# Patient Record
Sex: FEMALE | Race: AMERICAN INDIAN OR ALASKA NATIVE | Employment: UNEMPLOYED | URBAN - METROPOLITAN AREA
[De-identification: names, ages, dates, MRNs, and addresses within clinical notes are randomized per-mention and may not be internally consistent; named-entity substitution may affect disease eponyms.]

---

## 2017-04-03 ENCOUNTER — OFFICE VISIT (OUTPATIENT)
Dept: NEUROLOGY | Age: 57
End: 2017-04-03

## 2017-04-03 VITALS
SYSTOLIC BLOOD PRESSURE: 122 MMHG | OXYGEN SATURATION: 98 % | WEIGHT: 209 LBS | HEART RATE: 77 BPM | BODY MASS INDEX: 35.87 KG/M2 | DIASTOLIC BLOOD PRESSURE: 82 MMHG

## 2017-04-03 DIAGNOSIS — G40.209 PARTIAL SYMPTOMATIC EPILEPSY WITH COMPLEX PARTIAL SEIZURES, NOT INTRACTABLE, WITHOUT STATUS EPILEPTICUS (HCC): ICD-10-CM

## 2017-04-03 DIAGNOSIS — G40.909 SEIZURE DISORDER (HCC): ICD-10-CM

## 2017-04-03 DIAGNOSIS — F51.04 PSYCHOPHYSIOLOGICAL INSOMNIA: ICD-10-CM

## 2017-04-03 DIAGNOSIS — F09 MILD COGNITIVE DISORDER: Primary | ICD-10-CM

## 2017-04-03 DIAGNOSIS — G25.81 RESTLESS LEGS: ICD-10-CM

## 2017-04-03 RX ORDER — LAMOTRIGINE 100 MG/1
TABLET ORAL
Qty: 60 TAB | Refills: 11 | Status: SHIPPED | OUTPATIENT
Start: 2017-04-03 | End: 2018-06-18 | Stop reason: SDUPTHER

## 2017-04-03 NOTE — MR AVS SNAPSHOT
Visit Information Date & Time Provider Department Dept. Phone Encounter #  
 4/3/2017 11:00 AM Rika Gray MD Neurology [facility] at [facility] [phone] [id number] Follow-up Instructions Return in about 1 year (around 4/3/2018). Upcoming Health Maintenance Date Due Hepatitis C Screening 1960 DTaP/Tdap/Td series (1 - Tdap) 9/24/1979 PAP AKA CERVICAL CYTOLOGY 9/24/1979 BREAST CANCER SCRN MAMMOGRAM 9/24/2008 FOBT Q 1 YEAR AGE 50-75 9/24/2008 INFLUENZA AGE 9 TO ADULT 8/1/2016 Allergies as of 4/3/2017  Review Complete On: 4/3/2017 By: Raiza Alvarez Severity Noted Reaction Type Reactions Adhesive High 11/22/2011   Topical Rash K-y Lubricating [Lubricants] High 11/22/2011   Systemic Rash Neosporin [Benzalkonium Chloride] High 11/22/2011   Systemic Itching Sulfa (Sulfonamide Antibiotics) High 11/22/2011   Systemic Itching Current Immunizations  Reviewed on 10/14/2014 No immunizations on file. Not reviewed this visit You Were Diagnosed With   
  
 Codes Comments Mild cognitive disorder    -  Primary ICD-10-CM: F09 ICD-9-CM: 294.9 Partial symptomatic epilepsy with complex partial seizures, not intractable, without status epilepticus ([facility]     ICD-10-CM: O80.358 ICD-9-CM: 345.40 Restless legs     ICD-10-CM: G25.81 ICD-9-CM: 333.94 Seizure disorder ([facility]     ICD-10-CM: G40.909 ICD-9-CM: 345.90 Vitals BP Pulse Weight(growth percentile) SpO2 BMI OB Status 122/82 77 209 lb (94.8 kg) 98% 35.87 kg/m2 Postmenopausal  
 Smoking Status Never Smoker BMI and BSA Data Body Mass Index Body Surface Area  
 35.87 kg/m 2 2.07 m 2 Preferred Pharmacy Pharmacy Name Phone [address] [phone] [address] [phone] Your Updated Medication List  
  
   
 This list is accurate as of: 4/3/17 11:51 AM.  Always use your most recent med list.  
  
  
  
  
 ascorbic acid (vitamin C) 500 mg tablet Commonly known as:  VITAMIN C Take  by mouth.  
  
 calcium polycarbophil 625 mg tablet Commonly known as:  Remona Tayler Take 625 mg by mouth daily. clonazePAM 0.5 mg tablet Commonly known as:  Smallwood Nickerson Take 2 tabs at night and 1 tab BID prn anxiety  
  
 cyanocobalamin 500 mcg tablet Commonly known as:  VITAMIN B12 Take 500 mcg by mouth daily. doxepin 100 mg capsule Commonly known as:  SINEquan Take  by mouth nightly. ferrous sulfate 325 mg (65 mg iron) tablet Take  by mouth Daily (before breakfast). gabapentin 100 mg capsule Commonly known as:  NEURONTIN  
1 in afternoon and 2 at 5 PM  
  
 HYDROcodone-acetaminophen 5-325 mg per tablet Commonly known as:  Fredick Sly Take 1 Tab by mouth every four (4) hours as needed for Pain. Max Daily Amount: 6 Tabs. lamoTRIgine 100 mg tablet Commonly known as: LaMICtal  
TAKE 1 TABLET BY MOUTH 2 TIMES A DAY  
  
 multivitamin tablet Commonly known as:  ONE A DAY Take 1 Tab by mouth daily. omeprazole 20 mg tablet Commonly known as:  PriLOSEC OTC Take 20 mg by mouth daily. pramipexole 0.25 mg tablet Commonly known as:  MIRAPEX TAKE 1 TABLET BY MOUTH AT BEDTIME  
  
 promethazine 25 mg tablet Commonly known as:  PHENERGAN Take 1 Tab by mouth every six (6) hours as needed. ZOLOFT 100 mg tablet Generic drug:  sertraline Take 200 mg by mouth daily. zolpidem 5 mg tablet Commonly known as:  AMBIEN Take  by mouth nightly as needed for Sleep. Prescriptions Sent to Pharmacy Refills  
 lamoTRIgine (LAMICTAL) 100 mg tablet 11 Sig: TAKE 1 TABLET BY MOUTH 2 TIMES A DAY Class: Normal  
 Pharmacy: [address] [place] [address] #: [phone] Follow-up Instructions Return in about 1 year (around 4/3/2018). To-Do List   
 04/04/2017 Neurology:  EEG Patient Instructions A Healthy Lifestyle: Care Instructions Your Care Instructions A healthy lifestyle can help you feel good, stay at a healthy weight, and have plenty of energy for both work and play. A healthy lifestyle is something you can share with your whole family. A healthy lifestyle also can lower your risk for serious health problems, such as high blood pressure, heart disease, and diabetes. You can follow a few steps listed below to improve your health and the health of your family. Follow-up care is a key part of your treatment and safety. Be sure to make and go to all appointments, and call your doctor if you are having problems. Its also a good idea to know your test results and keep a list of the medicines you take. How can you care for yourself at home? · Do not eat too much sugar, fat, or fast foods. You can still have dessert and treats now and then. The goal is moderation. · Start small to improve your eating habits. Pay attention to portion sizes, drink less juice and soda pop, and eat more fruits and vegetables. ¨ Eat a healthy amount of food. A 3-ounce serving of meat, for example, is about the size of a deck of cards. Fill the rest of your plate with vegetables and whole grains. ¨ Limit the amount of soda and sports drinks you have every day. Drink more water when you are thirsty. ¨ Eat at least 5 servings of fruits and vegetables every day. It may seem like a lot, but it is not hard to reach this goal. A serving or helping is 1 piece of fruit, 1 cup of vegetables, or 2 cups of leafy, raw vegetables. Have an apple or some carrot sticks as an afternoon snack instead of a candy bar. Try to have fruits and/or vegetables at every meal. 
· Make exercise part of your daily routine.  You may want to start with simple activities, such as walking, bicycling, or slow swimming. Try to be active 30 to 60 minutes every day. You do not need to do all 30 to 60 minutes all at once. For example, you can exercise 3 times a day for 10 or 20 minutes. Moderate exercise is safe for most people, but it is always a good idea to talk to your doctor before starting an exercise program. 
· Keep moving. Brenda Edwardo the lawn, work in the garden, or Qonf. Take the stairs instead of the elevator at work. · If you smoke, quit. People who smoke have an increased risk for heart attack, stroke, cancer, and other lung illnesses. Quitting is hard, but there are ways to boost your chance of quitting tobacco for good. ¨ Use nicotine gum, patches, or lozenges. ¨ Ask your doctor about stop-smoking programs and medicines. ¨ Keep trying. In addition to reducing your risk of diseases in the future, you will notice some benefits soon after you stop using tobacco. If you have shortness of breath or asthma symptoms, they will likely get better within a few weeks after you quit. · Limit how much alcohol you drink. Moderate amounts of alcohol (up to 2 drinks a day for men, 1 drink a day for women) are okay. But drinking too much can lead to liver problems, high blood pressure, and other health problems. Family health If you have a family, there are many things you can do together to improve your health. · Eat meals together as a family as often as possible. · Eat healthy foods. This includes fruits, vegetables, lean meats and dairy, and whole grains. · Include your family in your fitness plan. Most people think of activities such as jogging or tennis as the way to fitness, but there are many ways you and your family can be more active. Anything that makes you breathe hard and gets your heart pumping is exercise. Here are some tips: 
¨ Walk to do errands or to take your child to school or the bus. ¨ Go for a family bike ride after dinner instead of watching TV. Where can you learn more? Go to http://tiffanie-andreina.info/. Enter J916 in the search box to learn more about \"A Healthy Lifestyle: Care Instructions. \" Current as of: July 26, 2016 Content Version: 11.2 © 3904-4906 [employer]. Care instructions adapted under license by [employer] (which disclaims liability or warranty for this information). If you have questions about a medical condition or this instruction, always ask your healthcare professional. [address] any warranty or liability for your use of this information. Introducing [facility]! Dear Bernadette Kline: Thank you for requesting a [employer] account. Our records indicate that you already have an active [employer] account. You can access your account anytime at [email or web address] Did you know that you can access your hospital and ER discharge instructions at any time in [employer]? You can also review all of your test results from your hospital stay or ER visit. Additional Information If you have questions, please visit the Frequently Asked Questions section of the [employer] website at [email or web address] Remember, [employer] is NOT to be used for urgent needs. For medical emergencies, dial 911. Now available from your iPhone and Android! Please provide this summary of care documentation to your next provider. Your primary care clinician is listed as Layton Fisher. If you have any questions after today's visit, please call [phone].

## 2017-04-03 NOTE — PATIENT INSTRUCTIONS

## 2017-04-03 NOTE — PROGRESS NOTES
Chief Complaint: Seizures    No seizures since her last visit had a few episodes of double vision. She is content with Lamictal. She has some memory issues. She has no new complaints Discussed insomnia and proper sleep hygeine      Assesment and Plan  1. Partial symptomatic epilepsy with complex partial seizures, not intractable, without status epilepticus (HCC)  - lamoTRIgine (LAMICTAL) 100 mg tablet; TAKE 1 TABLET BY MOUTH 2 TIMES A DAY  Dispense: 60 Tab; Refill: 11    - EEG; Future    2. Mild cognitive disorder  May be medications induced    3. Restless legs  Continue mirapex    4. Insomnia  Encouraged proper sleep hygiene  Continue doxepin    Allergies  Adhesive; K-y lubricating [lubricants]; Neosporin [benzalkonium chloride]; and Sulfa (sulfonamide antibiotics)     Medications  Current Outpatient Prescriptions   Medication Sig    omeprazole (PRILOSEC OTC) 20 mg tablet Take 20 mg by mouth daily.  doxepin (SINEQUAN) 100 mg capsule Take  by mouth nightly.  zolpidem (AMBIEN) 5 mg tablet Take  by mouth nightly as needed for Sleep.  ferrous sulfate 325 mg (65 mg iron) tablet Take  by mouth Daily (before breakfast).  calcium polycarbophil (FIBERCON) 625 mg tablet Take 625 mg by mouth daily.  cyanocobalamin (VITAMIN B12) 500 mcg tablet Take 500 mcg by mouth daily.  multivitamin (ONE A DAY) tablet Take 1 Tab by mouth daily.  ascorbic acid (VITAMIN C) 500 mg tablet Take  by mouth.  gabapentin (NEURONTIN) 100 mg capsule 1 in afternoon and 2 at 5 PM (Patient taking differently: 300 mg. 1 in afternoon and 2 at 5 PM)    lamoTRIgine (LAMICTAL) 100 mg tablet TAKE 1 TABLET BY MOUTH 2 TIMES A DAY    clonazePAM (KLONOPIN) 0.5 mg tablet Take 2 tabs at night and 1 tab BID prn anxiety    sertraline (ZOLOFT) 100 mg tablet Take 200 mg by mouth daily.  HYDROcodone-acetaminophen (NORCO) 5-325 mg per tablet Take 1 Tab by mouth every four (4) hours as needed for Pain. Max Daily Amount: 6 Tabs.     promethazine (PHENERGAN) 25 mg tablet Take 1 Tab by mouth every six (6) hours as needed.  pramipexole (MIRAPEX) 0.25 mg tablet TAKE 1 TABLET BY MOUTH AT BEDTIME     No current facility-administered medications for this visit. Medical History  Past Medical History:   Diagnosis Date    Localization-related (focal) (partial) epilepsy and epileptic syndromes with complex partial seizures, without mention of intractable epilepsy 10/3/2013    Mild cognitive disorder 10/3/2013    Psychiatric disorder     depression    Restless leg syndrome      Review of Systems   Constitutional: Negative for chills and fever. HENT: Negative for ear pain. Eyes: Negative for pain and discharge. Respiratory: Negative for cough and hemoptysis. Cardiovascular: Negative for chest pain and claudication. Gastrointestinal: Negative for constipation and diarrhea. Genitourinary: Negative for flank pain and hematuria. Musculoskeletal: Negative for back pain and myalgias. Skin: Negative for itching and rash. Neurological: Negative for speech change, focal weakness and headaches. Endo/Heme/Allergies: Negative for environmental allergies. Does not bruise/bleed easily. Psychiatric/Behavioral: Positive for memory loss. Negative for depression and hallucinations. The patient is nervous/anxious and has insomnia. Exam:    Visit Vitals    /82    Pulse 77    Wt 209 lb (94.8 kg)    SpO2 98%    BMI 35.87 kg/m2        Physical Exam   Constitutional: She is oriented to person, place, and time and well-developed, well-nourished, and in no distress. HENT:   Head: Normocephalic and atraumatic. Eyes: Conjunctivae and EOM are normal. Pupils are equal, round, and reactive to light. Neck: Neck supple. No JVD present. Carotid bruit is not present. No thyromegaly present. Cardiovascular: Normal rate, regular rhythm and normal heart sounds.     Pulmonary/Chest: Effort normal and breath sounds normal. No respiratory distress. She has no wheezes. She has no rales. Abdominal: Soft. Bowel sounds are normal. She exhibits no distension. There is no tenderness. Neurological: She is alert and oriented to person, place, and time. She has normal strength, normal reflexes and intact cranial nerves. Gait normal.   Eyes: PERRLA, Conjugate eye movements with no Nystagmus or ptosis  Sensory :Preceives crude touch and vibration in a symmetric fashion in proximal and distal limbs. Gait is well balanced with good arm swing. Skin: No rash noted. No erythema. Imaging    CT Results (most recent):    Results from Hospital Encounter encounter on 05/18/16   CTA CHEST W WO CONT   Narrative **Final Report**      ICD Codes / Adm. Diagnosis: 479606   / Chest Pain  Chest Pain; SOB  Examination:  CT CHEST ANGIOGRAPHY  - [id number] - May 18 2016  6:33PM  Accession No:  [id number]  Reason:  CP      REPORT:  EXAM:  CT CHEST ANGIOGRAPHY  INDICATION:   CP  Additional history: Chest pain radiating to the back and to the jaw x2-3   hours. COMPARISON: None. Limitations: Motion limited exam  .  TECHNIQUE:   Precontrast  images were obtained to localize the volume for   acquisition. Multislice helical CT arteriography was performed from the   diaphragm to the thoracic inlet during uneventful rapid bolus intravenous   contrast administration. Lung and soft tissue windows were generated. Coronal and sagittal images were generated and 3D post processing consisting   of coronal maximum intensity images was performed. Nelly Aaron FINDINGS:  CHEST:  Chest wall/thoracic inlet: Within normal limits. Thyroid: Within normal limits. Mediastinum/rita: Within normal limits. Heart/vessels: Within normal limits. Lungs/Pleura: Normal areas of groundglass attenuation in the perihilar, left   upper lobe. As a 0.6 cm nodule in the left, posterior costophrenic sulcus  . INCIDENTALLY IMAGED ABDOMEN:  Post surgical changes suggesting gastric bypass.  There is a heterogeneous   lesion in the posterior liver, segment 6 which may represent a hemangioma   . MSK:   Degenerative changes throughout the spine. .      IMPRESSION: Motion limited. 1. No visualized pulmonary embolus. 2. Small, discontinuous patchy areas of groundglass attenuation in the   perihilar, left upper lobe which is a nonspecific finding and could   represent an infectious/inflammatory process. Recommend follow-up until   resolution. 3. Heterogeneous lesion in segment 6 of the liver which may represent a   hemangioma. Recommend comparison to previous abdominal imaging to ensure   stability. 4. There is a 0.6 cm nodule in the posterior, left costophrenic sulcus. 5. Incidental findings as above. Signing/Reading Doctor: Tasha Haines ([id number])    Sky Huerta ([id number])  May 18 2016  6:47PM                                   MRI Results (most recent):  No results found for this or any previous visit.     .  Lab Review    Lab Results   Component Value Date/Time    WBC 6.2 05/18/2016 02:43 PM    HCT 39.9 05/18/2016 02:43 PM    HGB 12.6 05/18/2016 02:43 PM    PLATELET 641 27/49/5482 02:43 PM       Lab Results   Component Value Date/Time    Sodium 143 05/18/2016 02:43 PM    Potassium 4.6 05/18/2016 02:43 PM    Chloride 110 05/18/2016 02:43 PM    CO2 27 05/18/2016 02:43 PM    Glucose 79 05/18/2016 02:43 PM    BUN 9 05/18/2016 02:43 PM    Creatinine 0.73 05/18/2016 02:43 PM    Calcium 8.9 05/18/2016 02:43 PM         Lab Results   Component Value Date/Time    Vitamin B12 1034 05/28/2014 08:35 AM    Folate >19.9 05/28/2014 08:35 AM

## 2017-10-23 ENCOUNTER — TELEPHONE (OUTPATIENT)
Dept: NEUROLOGY | Age: 57
End: 2017-10-23

## 2017-10-23 NOTE — TELEPHONE ENCOUNTER
I spoke with the patient. She wanted to let Dr Kwesi Garcia know that about a month ago, she was in bed and started feeling shaky in her eyes, then got tired. Didn't realize she had fallen asleep and then woke up with blood on her pillow from biting it. Thinks she had another seizure, but has no witnesses of it. She never got her EEG and deciding to do this now.  Gave her the phone number to the patient care team to schedule  Please advise if anything else needs to be done in the meantime

## 2017-10-31 NOTE — TELEPHONE ENCOUNTER
Spoke with the patient and asked when she was going to get her EEG done, patient stated that she hasn't scheduled it yet.  Advised to the patient to call once she gets that done so Dr. Park Valentin would be able to follow up with her

## 2017-11-08 ENCOUNTER — TELEPHONE (OUTPATIENT)
Dept: NEUROLOGY | Age: 57
End: 2017-11-08

## 2017-11-08 NOTE — TELEPHONE ENCOUNTER
Patient has her EEG sched for 11/14 at 8 am and requested to spk with the nurse for an apptmt for the results

## 2017-11-09 NOTE — TELEPHONE ENCOUNTER
Advised to the patient to give me a call once she has the testing done and I would be able to send Dr. Brody Ace a message to review the results and give her a call back

## 2017-11-14 ENCOUNTER — HOSPITAL ENCOUNTER (OUTPATIENT)
Dept: NEUROLOGY | Age: 57
Discharge: HOME | End: 2017-11-14
Attending: PSYCHIATRY & NEUROLOGY
Payer: MEDICARE

## 2017-11-14 DIAGNOSIS — G40.209 PARTIAL SYMPTOMATIC EPILEPSY WITH COMPLEX PARTIAL SEIZURES, NOT INTRACTABLE, WITHOUT STATUS EPILEPTICUS (HCC): ICD-10-CM

## 2017-11-14 PROCEDURE — 95816 EEG AWAKE AND DROWSY: CPT

## 2017-11-18 NOTE — PROCEDURES
[address]   EEG       Name:  Kristi Weber   MR#:  [id number]   :  1960   Account #:  [de-identified]    Date of Procedure:  2017   Date of Adm:  2017       Electrodes were applied in accordance with International 10/20 system   electrode placement. The EEG was reviewed in both bipolar and   referential accordance. Background consists of symmetric 10 Hz activity which attenuated with   eye opening. Photic stimulation produced a symmetric occipital drive. No seizures or interictal hallmarks of epilepsy are noted on the study. IMPRESSION: Normal electroencephalogram. The absence of   seizures on this study does not exclude that diagnosis of epilepsy. Clinical correlation is advised.         Valerie Avalos MD       / [facility], [facility].   D:  2017   15:17   T:  2017   16:54   Job #:  [id number]

## 2017-11-20 ENCOUNTER — TELEPHONE (OUTPATIENT)
Dept: NEUROLOGY | Age: 57
End: 2017-11-20

## 2017-11-20 NOTE — TELEPHONE ENCOUNTER
----- Message from Amisha William sent at 11/20/2017 12:27 PM EST -----  Regarding: Dr Sena/Telephone  Pt would like to receive her EEG results. Best contact number is [phone].

## 2017-11-21 NOTE — TELEPHONE ENCOUNTER
Normal electroencephalogram. The absence of   seizures on this study does not exclude that diagnosis of epilepsy. Clinical correlation is advised.

## 2018-06-18 DIAGNOSIS — G40.909 SEIZURE DISORDER (HCC): ICD-10-CM

## 2018-06-18 NOTE — TELEPHONE ENCOUNTER
No future appointments. Last Appointment My Department:  Visit date not found    Would you like to refill below?     Requested Prescriptions     Pending Prescriptions Disp Refills    lamoTRIgine (LAMICTAL) 100 mg tablet 60 Tab 11     Sig: TAKE 1 TABLET BY MOUTH 2 TIMES A DAY

## 2018-06-19 RX ORDER — LAMOTRIGINE 100 MG/1
TABLET ORAL
Qty: 60 TAB | Refills: 11 | Status: SHIPPED | OUTPATIENT
Start: 2018-06-19 | End: 2019-09-03 | Stop reason: SDUPTHER

## 2018-06-20 ENCOUNTER — TELEPHONE (OUTPATIENT)
Dept: NEUROLOGY | Age: 58
End: 2018-06-20

## 2018-11-25 ENCOUNTER — HOSPITAL ENCOUNTER (EMERGENCY)
Age: 58
Discharge: HOME | End: 2018-11-25
Attending: EMERGENCY MEDICINE
Payer: MEDICARE

## 2018-11-25 ENCOUNTER — APPOINTMENT (OUTPATIENT)
Dept: CT IMAGING | Age: 58
End: 2018-11-25
Attending: EMERGENCY MEDICINE
Payer: MEDICARE

## 2018-11-25 ENCOUNTER — APPOINTMENT (OUTPATIENT)
Dept: GENERAL RADIOLOGY | Age: 58
End: 2018-11-25
Attending: NURSE PRACTITIONER
Payer: MEDICARE

## 2018-11-25 VITALS
HEIGHT: 64 IN | WEIGHT: 210.76 LBS | SYSTOLIC BLOOD PRESSURE: 175 MMHG | OXYGEN SATURATION: 99 % | HEART RATE: 89 BPM | BODY MASS INDEX: 35.98 KG/M2 | DIASTOLIC BLOOD PRESSURE: 101 MMHG | TEMPERATURE: 97.8 F | RESPIRATION RATE: 16 BRPM

## 2018-11-25 DIAGNOSIS — S02.2XXA CLOSED FRACTURE OF NASAL BONE, INITIAL ENCOUNTER: ICD-10-CM
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
## 2018-11-25 PROCEDURE — 90471 IMMUNIZATION ADMIN: CPT

## 2018-11-25 PROCEDURE — 70450 CT HEAD/BRAIN W/O DYE: CPT

## 2018-11-25 PROCEDURE — 99282 EMERGENCY DEPT VISIT SF MDM: CPT

## 2018-11-25 PROCEDURE — 74011250636 HC RX REV CODE- 250/636: Performed by: NURSE PRACTITIONER

## 2018-11-25 PROCEDURE — 73110 X-RAY EXAM OF WRIST: CPT

## 2018-11-25 PROCEDURE — 90715 TDAP VACCINE 7 YRS/> IM: CPT | Performed by: NURSE PRACTITIONER

## 2018-11-25 PROCEDURE — 70486 CT MAXILLOFACIAL W/O DYE: CPT

## 2018-11-25 PROCEDURE — 73130 X-RAY EXAM OF HAND: CPT

## 2018-11-25 RX ORDER — AMOXICILLIN AND CLAVULANATE POTASSIUM 875; 125 MG/1; MG/1
1 TABLET, FILM COATED ORAL 2 TIMES DAILY
Qty: 20 TAB | Refills: 0 | Status: SHIPPED | OUTPATIENT
Start: 2018-11-25 | End: 2018-12-05

## 2018-11-25 RX ORDER — HYDROCODONE BITARTRATE AND ACETAMINOPHEN 5; 325 MG/1; MG/1
1 TABLET ORAL
Qty: 20 TAB | Refills: 0 | Status: SHIPPED | OUTPATIENT
Start: 2018-11-25 | End: 2022-06-10

## 2018-11-25 RX ADMIN — TETANUS TOXOID, REDUCED DIPHTHERIA TOXOID AND ACELLULAR PERTUSSIS VACCINE, ADSORBED 0.5 ML: 5; 2.5; 8; 8; 2.5 SUSPENSION INTRAMUSCULAR at 17:58

## 2018-11-25 NOTE — DISCHARGE INSTRUCTIONS
We hope that we have addressed all of your medical concerns. The examination and treatment you received in the Emergency Department were for an emergent problem and were not intended as complete care. It is important that you follow up with your healthcare provider(s) for ongoing care. If your symptoms worsen or do not improve as expected, and you are unable to reach your usual health care provider(s), you should return to the Emergency Department. [facility] participate in nationally recognized quality of care measures. If your blood pressure is greater than 120/80, as reported below, we urge that you seek medical care to address the potential of high blood pressure, commonly known as hypertension. Hypertension can be hereditary or can be caused by certain medical conditions, pain, stress, or \"white coat syndrome. \"       Please make an appointment with your health care provider(s) for follow up of your Emergency Department visit. VITALS:   Patient Vitals for the past 8 hrs:   Temp Pulse Resp BP SpO2   11/25/18 1459 97.8 °F (36.6 °C) 89 16 (!) 175/101 99 %          Thank you for allowing us to provide you with medical care today. We realize that you have many choices for your emergency care needs. Please choose us in the future for any continued health care needs. Delma Trevino NP              No results found for this or any previous visit (from the past 24 hour(s)). Xr Wrist Rt Ap/lat/obl Min 3v    Result Date: 11/25/2018  XR WRIST RT AP/LAT/OBL MIN 3V, XR HAND RT MIN 3 V ,11/25/2018 5:35 PM INDICATION: Additional history: Ground-level fall last night. Pain and swelling to the fingers on right hand. COMPARISON: None . FINDINGS: WRIST: 3 views The bones, joints, and soft tissues of the wrist are unremarkable. The joint spaces and alignment are maintained. Fidel Cancer HAND: 3 views No visible fracture or malalignment.  There are degenerative changes in the interphalangeal joints. .    IMPRESSION:  1. No visible fracture or malalignment. Xr Hand Rt Min 3 V    Result Date: 11/25/2018  XR WRIST RT AP/LAT/OBL MIN 3V, XR HAND RT MIN 3 V ,11/25/2018 5:35 PM INDICATION: Additional history: Ground-level fall last night. Pain and swelling to the fingers on right hand. COMPARISON: None . FINDINGS: WRIST: 3 views The bones, joints, and soft tissues of the wrist are unremarkable. The joint spaces and alignment are maintained. Bernice Hollow HAND: 3 views No visible fracture or malalignment. There are degenerative changes in the interphalangeal joints. .    IMPRESSION:  1. No visible fracture or malalignment. Ct Head Wo Cont    Result Date: 11/25/2018  EXAM:  CT HEAD WO CONT INDICATION:   head injury with possible LOC. Additional history:Ground-level fall last night after tripping questionable loss of consciousness. Facial pain. COMPARISON: CT of the head, 11/22/2011. . TECHNIQUE: Unenhanced CT of the head was performed using 5 mm images. Coronal and sagittal reformats were produced. Brain and bone windows were generated. CT dose reduction was achieved through use of a standardized protocol tailored for this examination and automatic exposure control for dose modulation. Bernice Hollow FINDINGS: The ventricles and sulci are normal in size, shape and configuration and midline. There is no significant white matter disease. There is no intracranial hemorrhage, extra-axial collection, mass, mass effect or midline shift. The basilar cisterns are open. No acute infarct is identified. The bone windows demonstrate no abnormalities. The visualized portions of the paranasal sinuses and mastoid air cells are clear. Bernice Hollow IMPRESSION: 1. No evidence of acute intracranial abnormality by this modality.       Ct Maxillofacial Wo Cont    Result Date: 11/25/2018  EXAM:  CT MAXILLOFACIAL WO CONT INDICATION:   Facial pain s/o glf Additional history:Ground-level fall last night after tripping questionable loss of consciousness. Facial pain. COMPARISON:  None. TECHNIQUE:  Multislice helical CT of the facial bones was performed in the axial plane without intravenous contrast administration. Coronal and sagittal reformations were generated. CT dose reduction was achieved through use of a standardized protocol tailored for this examination and automatic exposure control for dose modulation. FINDINGS: Bony fragment displaced anteriorly off at the bridge of the nose. The visualized paranasal sinuses and mastoid air cells are clear. The globes, optic nerves and extraocular muscles are normal. No abnormalities are identified within the visualized portions of the nasopharynx. IMPRESSION: 1. Likely nasal bone fracture.

## 2018-11-25 NOTE — ED NOTES
I have assumed care of the patient. The patient has been placed in a position of comfort with the call bell within reach. The patient presents to the ED with complaints of pain and swelling with bruising to the right hand and the nose after falling yesterday while putting up [place] directions.

## 2018-11-26 NOTE — ED PROVIDER NOTES
EMERGENCY DEPARTMENT HISTORY AND PHYSICAL EXAM 
 
 
Date: 11/25/2018 Patient Name: Ezequiel Soemrs History of Presenting Illness Chief Complaint Patient presents with  Fall Ambulatory into triage with c/o facial pain s/p glf last night. Reports tripping, causing her to fall forward onto her face. She is unsure of LOC. Sent to ED from [facility] for futher eval. Pt also reports pain and swelling to fingers on her Rt hand-reports negative x-ray at [facility], but she did no bring a copy of the images. A&O and interacting appropirately in triage.  Facial Pain  Hand Pain History Provided By: Patient HPI: Ezequiel Somers, 59 y.o. female with PMHx significant for RLS, presents ambulatory from home to the ED with cc of facial pain and right hand pain. She fell last night while trying to assemble [employer]. Uncertain if there was LOC. Referred from [facility]. No altered sensorium. Right hand is bruised and swollen. She has superficial abrasions to face. Last tetanus unknown. Pt denies fevers, chills, night sweats, chest pain, pressure, SOB, MEZA, PND, orthopnea, abdominal pain, n/v/d, melena, hematuria, dysuria, constipation, HA, dizziness, and syncope. PCP: Santino Bruno MD 
 
Current Outpatient Medications Medication Sig Dispense Refill  amoxicillin-clavulanate (AUGMENTIN) 875-125 mg per tablet Take 1 Tab by mouth two (2) times a day for 10 days. 20 Tab 0  
 HYDROcodone-acetaminophen (NORCO) 5-325 mg per tablet Take 1 Tab by mouth every four (4) hours as needed for Pain. Max Daily Amount: 6 Tabs. 20 Tab 0  
 lamoTRIgine (LAMICTAL) 100 mg tablet TAKE 1 TABLET BY MOUTH 2 TIMES A DAY 60 Tab 11  
 promethazine (PHENERGAN) 25 mg tablet Take 1 Tab by mouth every six (6) hours as needed. 12 Tab 0  
 omeprazole (PRILOSEC OTC) 20 mg tablet Take 20 mg by mouth daily. 30 Tab 0  
 doxepin (SINEQUAN) 100 mg capsule Take  by mouth nightly.  zolpidem (AMBIEN) 5 mg tablet Take  by mouth nightly as needed for Sleep.  ferrous sulfate 325 mg (65 mg iron) tablet Take  by mouth Daily (before breakfast).  calcium polycarbophil (FIBERCON) 625 mg tablet Take 625 mg by mouth daily.  cyanocobalamin (VITAMIN B12) 500 mcg tablet Take 500 mcg by mouth daily.  multivitamin (ONE A DAY) tablet Take 1 Tab by mouth daily.  ascorbic acid (VITAMIN C) 500 mg tablet Take  by mouth.  gabapentin (NEURONTIN) 100 mg capsule 1 in afternoon and 2 at 5 PM (Patient taking differently: 300 mg. 1 in afternoon and 2 at 5 PM) 90 Cap 1  
 pramipexole (MIRAPEX) 0.25 mg tablet TAKE 1 TABLET BY MOUTH AT BEDTIME 30 tablet 3  clonazePAM (KLONOPIN) 0.5 mg tablet Take 2 tabs at night and 1 tab BID prn anxiety 90 tablet 4  
 sertraline (ZOLOFT) 100 mg tablet Take 200 mg by mouth daily. Past History Past Medical History: 
Past Medical History:  
Diagnosis Date  Localization-related (focal) (partial) epilepsy and epileptic syndromes with complex partial seizures, without mention of intractable epilepsy 10/3/2013  Mild cognitive disorder 10/3/2013  Psychiatric disorder   
 depression  Restless leg syndrome Past Surgical History: 
Past Surgical History:  
Procedure Laterality Date  BREAST SURGERY PROCEDURE UNLISTED    
 fibroid removal  
 HX DILATION AND CURETTAGE    
 HX GASTRIC BYPASS  HX ORTHOPAEDIC    
 anthony. heel spurs  HX TONSILLECTOMY Family History: No family history on file. Social History: 
Social History Tobacco Use  Smoking status: Never Smoker Substance Use Topics  Alcohol use: Yes Comment: \"sometimes\"  Drug use: Not on file Allergies: Allergies Allergen Reactions  Adhesive Rash  K-Y Lubricating [Lubricants] Rash  Neosporin [Benzalkonium Chloride] Itching  Sulfa (Sulfonamide Antibiotics) Itching Review of Systems Review of Systems Constitutional: Negative for activity change, appetite change, chills, diaphoresis, fatigue, fever and unexpected weight change. HENT: Negative for congestion, ear pain, rhinorrhea, sinus pressure, sore throat and tinnitus. Facial pain Eyes: Negative for photophobia, pain, discharge and visual disturbance. Respiratory: Negative for apnea, cough, choking, chest tightness, shortness of breath, wheezing and stridor. Cardiovascular: Negative for chest pain, palpitations and leg swelling. Gastrointestinal: Negative for abdominal pain, constipation, diarrhea, nausea and vomiting. Endocrine: Negative for polydipsia, polyphagia and polyuria. Genitourinary: Negative for decreased urine volume, dyspareunia, dysuria, enuresis, flank pain, frequency, hematuria and urgency. Musculoskeletal: Positive for arthralgias. Negative for back pain, gait problem, myalgias and neck pain. Skin: Positive for wound. Negative for color change, pallor and rash. Allergic/Immunologic: Negative for immunocompromised state. Neurological: Negative for dizziness, seizures, syncope, weakness, light-headedness and headaches. Hematological: Does not bruise/bleed easily. Psychiatric/Behavioral: Negative for agitation and confusion. The patient is not nervous/anxious. Physical Exam  
Physical Exam  
Constitutional: She is oriented to person, place, and time. She appears well-developed and well-nourished. No distress. HENT:  
Head: Normocephalic. Right Ear: External ear normal.  
Left Ear: External ear normal.  
Nose: Nose normal. No nasal septal hematoma. Mouth/Throat: Oropharynx is clear and moist. No oropharyngeal exudate, posterior oropharyngeal edema, posterior oropharyngeal erythema or tonsillar abscesses. Eyes: Conjunctivae and EOM are normal. Pupils are equal, round, and reactive to light. Right eye exhibits no discharge. Left eye exhibits no discharge. No scleral icterus. Neck: Normal range of motion. Neck supple. No JVD present. No spinous process tenderness and no muscular tenderness present. No neck rigidity. No tracheal deviation, no edema, no erythema and normal range of motion present. No thyromegaly present. Cardiovascular: Normal rate, regular rhythm, normal heart sounds and intact distal pulses. Exam reveals no gallop and no friction rub. No murmur heard. Pulmonary/Chest: Effort normal and breath sounds normal. No stridor. No respiratory distress. She has no wheezes. She has no rales. She exhibits no tenderness. Abdominal: Soft. Bowel sounds are normal. She exhibits no distension and no mass. There is no tenderness. There is no rebound and no guarding. Musculoskeletal: Normal range of motion. She exhibits no edema. Right hand: She exhibits tenderness, bony tenderness and swelling. She exhibits normal range of motion, normal two-point discrimination, normal capillary refill, no deformity and no laceration. Normal sensation noted. Decreased sensation is not present in the ulnar distribution, is not present in the medial distribution and is not present in the radial distribution. Normal strength noted. She exhibits no finger abduction and no wrist extension trouble. Hands: 
Lymphadenopathy:  
  She has no cervical adenopathy. Neurological: She is alert and oriented to person, place, and time. She has normal strength. She displays normal reflexes. No cranial nerve deficit or sensory deficit. Coordination normal. GCS eye subscore is 4. GCS verbal subscore is 5. GCS motor subscore is 6. Skin: Skin is warm and dry. No rash noted. She is not diaphoretic. No erythema. No pallor. Psychiatric: She has a normal mood and affect. Her behavior is normal. Judgment and thought content normal.  
Nursing note and vitals reviewed. Diagnostic Study Results Labs - No results found for this or any previous visit (from the past 12 hour(s)). Radiologic Studies -  
XR HAND RT MIN 3 V Final Result XR WRIST RT AP/LAT/OBL MIN 3V Final Result CT MAXILLOFACIAL WO CONT Final Result CT HEAD WO CONT Final Result CT Results  (Last 48 hours)  
          
 11/25/18 1551  CT HEAD WO CONT Final result Impression:  IMPRESSION:   
1. No evidence of acute intracranial abnormality by this modality. Narrative:  EXAM:  CT HEAD WO CONT INDICATION:   head injury with possible LOC. Additional history:Ground-level fall last night after tripping questionable loss  
of consciousness. Facial pain. COMPARISON: CT of the head, 11/22/2011. .  
TECHNIQUE:   
Unenhanced CT of the head was performed using 5 mm images. Coronal and sagittal  
reformats were produced. Brain and bone windows were generated. CT dose reduction was achieved through use of a standardized protocol tailored  
for this examination and automatic exposure control for dose modulation. [facility] FINDINGS:  
The ventricles and sulci are normal in size, shape and configuration and  
midline. There is no significant white matter disease. There is no intracranial  
hemorrhage, extra-axial collection, mass, mass effect or midline shift. The  
basilar cisterns are open. No acute infarct is identified. The bone windows  
demonstrate no abnormalities. The visualized portions of the paranasal sinuses  
and mastoid air cells are clear. .  
  
 11/25/18 1551  CT MAXILLOFACIAL WO CONT Final result Impression:  IMPRESSION:   
1. Likely nasal bone fracture. Narrative:  EXAM:  CT MAXILLOFACIAL WO CONT INDICATION:   Facial pain s/o glf Additional history:Ground-level fall last night after tripping questionable loss  
of consciousness. Facial pain. COMPARISON:  None. TECHNIQUE:  Multislice helical CT of the facial bones was performed in the axial  
plane without intravenous contrast administration.  Coronal and sagittal  
 reformations were generated. CT dose reduction was achieved through use of a  
standardized protocol tailored for this examination and automatic exposure  
control for dose modulation. FINDINGS:  
   
Bony fragment displaced anteriorly off at the bridge of the nose. The visualized paranasal sinuses and mastoid air cells are clear. The globes, optic nerves and extraocular muscles are normal.  
   
No abnormalities are identified within the visualized portions of the  
nasopharynx. CXR Results  (Last 48 hours) None Medical Decision Making I am the first provider for this patient. I reviewed the vital signs, available nursing notes, past medical history, past surgical history, family history and social history. Vital Signs-Reviewed the patient's vital signs. Patient Vitals for the past 12 hrs: 
 Temp Pulse Resp BP SpO2  
11/25/18 1459 97.8 °F (36.6 °C) 89 16 (!) 175/101 99 % Pulse Oximetry Analysis - 99% on RA Cardiac Monitor:  
Rate: 89 bpm 
 
Records Reviewed: Nursing Notes and Old Medical Records Provider Notes (Medical Decision Making): Mechanical fall - facial trauma and right hand pain CT max face XR right hand Update tetanus Splint right hand ED Course:  
Initial assessment performed. The patients presenting problems have been discussed, and they are in agreement with the care plan formulated and outlined with them. I have encouraged them to ask questions as they arise throughout their visit. Stable, ambulatory pt in [facility] Critical Care Time:  
0 Disposition: 
Discharge to home with PCP and Ortho follow up PLAN: 
1. Discharge Medication List as of 11/25/2018  6:13 PM  
  
START taking these medications Details  
amoxicillin-clavulanate (AUGMENTIN) 875-125 mg per tablet Take 1 Tab by mouth two (2) times a day for 10 days. , Print, Disp-20 Tab, R-0  
  
  
CONTINUE these medications which have CHANGED Details HYDROcodone-acetaminophen (NORCO) 5-325 mg per tablet Take 1 Tab by mouth every four (4) hours as needed for Pain. Max Daily Amount: 6 Tabs., Print, Disp-20 Tab, R-0  
  
  
CONTINUE these medications which have NOT CHANGED Details  
lamoTRIgine (LAMICTAL) 100 mg tablet TAKE 1 TABLET BY MOUTH 2 TIMES A DAY, Normal, Disp-60 Tab, R-11  
  
promethazine (PHENERGAN) 25 mg tablet Take 1 Tab by mouth every six (6) hours as needed. , Print, Disp-12 Tab, R-0  
  
omeprazole (PRILOSEC OTC) 20 mg tablet Take 20 mg by mouth daily. , Print, Disp-30 Tab, R-0  
  
doxepin (SINEQUAN) 100 mg capsule Take  by mouth nightly., Historical Med  
  
zolpidem (AMBIEN) 5 mg tablet Take  by mouth nightly as needed for Sleep., Historical Med  
  
ferrous sulfate 325 mg (65 mg iron) tablet Take  by mouth Daily (before breakfast). , Historical Med  
  
calcium polycarbophil (FIBERCON) 625 mg tablet Take 625 mg by mouth daily. , Historical Med  
  
cyanocobalamin (VITAMIN B12) 500 mcg tablet Take 500 mcg by mouth daily. , Historical Med  
  
multivitamin (ONE A DAY) tablet Take 1 Tab by mouth daily. , Historical Med  
  
ascorbic acid (VITAMIN C) 500 mg tablet Take  by mouth., Historical Med  
  
gabapentin (NEURONTIN) 100 mg capsule 1 in afternoon and 2 at 5 PM, Normal, Disp-90 Cap, R-1  
  
pramipexole (MIRAPEX) 0.25 mg tablet TAKE 1 TABLET BY MOUTH AT BEDTIME, Normal, Disp-30 tablet, R-3  
  
clonazePAM (KLONOPIN) 0.5 mg tablet Take 2 tabs at night and 1 tab BID prn anxiety, Print, Disp-90 tablet, R-4  
  
sertraline (ZOLOFT) 100 mg tablet Take 200 mg by mouth daily. , Historical Med 2. Follow-up Information Follow up With Specialties Details Why Contact Info Gloria Zhou MD Family Practice In 2 days  [address] [facility] [address] [phone] Marlene Romero MD Otolaryngology In 2 days  [address] [facility] [address]. [phone] [facility] EMERGENCY DEPT Emergency Medicine  As needed, If symptoms worsen [address] [facility] 
[phone] Return to ED if worse Diagnosis Clinical Impression: 1. Fall, initial encounter 2. Closed fracture of nasal bone, initial encounter Attestations: 
 
Mary Ellen Montalvo NP 
7:15 PM

## 2019-09-03 DIAGNOSIS — G40.909 SEIZURE DISORDER (HCC): ICD-10-CM

## 2019-09-06 RX ORDER — LAMOTRIGINE 100 MG/1
TABLET ORAL
Qty: 60 TAB | Refills: 11 | Status: SHIPPED | OUTPATIENT
Start: 2019-09-06 | End: 2021-07-23 | Stop reason: SDUPTHER

## 2019-10-21 ENCOUNTER — HOSPITAL ENCOUNTER (OUTPATIENT)
Dept: MAMMOGRAPHY | Age: 59
Discharge: HOME | End: 2019-10-21
Payer: MEDICARE

## 2019-10-21 DIAGNOSIS — Z12.31 VISIT FOR SCREENING MAMMOGRAM: ICD-10-CM

## 2019-10-21 PROCEDURE — 77067 SCR MAMMO BI INCL CAD: CPT

## 2019-10-31 ENCOUNTER — HOSPITAL ENCOUNTER (OUTPATIENT)
Dept: MAMMOGRAPHY | Age: 59
Discharge: HOME | End: 2019-10-31
Attending: FAMILY MEDICINE
Payer: MEDICARE

## 2019-10-31 DIAGNOSIS — R92.8 ABNORMAL MAMMOGRAM: ICD-10-CM

## 2019-10-31 PROCEDURE — 77061 BREAST TOMOSYNTHESIS UNI: CPT

## 2021-07-22 DIAGNOSIS — G40.909 SEIZURE DISORDER (HCC): ICD-10-CM

## 2021-07-22 NOTE — TELEPHONE ENCOUNTER
----- Message from Court Schlatter sent at 7/22/2021 12:37 PM EDT -----  Regarding: Dr. Julian Mojica (if not patient): Self    Relationship of caller (if not patient): N/A    Best contact number(s): [phone]    Name of medication and dosage if known: \"Lamotrigine\" 100mgs    Is patient out of this medication (yes/no):  No    Pharmacy name: [id number]    Pharmacy listed in chart? (yes/no): Yes    Pharmacy phone number: [phone]    Date of last visit: 4/3/2017    Details to clarify the request: Pt has instructions to take twice daily, but has only been taking once dialy

## 2021-07-24 RX ORDER — LAMOTRIGINE 100 MG/1
TABLET ORAL
Qty: 60 TABLET | Refills: 11 | Status: SHIPPED | OUTPATIENT
Start: 2021-07-24 | End: 2022-07-30

## 2022-06-10 ENCOUNTER — OFFICE VISIT (OUTPATIENT)
Dept: FAMILY MEDICINE CLINIC | Age: 62
End: 2022-06-10
Payer: MEDICARE

## 2022-06-10 VITALS
TEMPERATURE: 98.5 F | HEART RATE: 75 BPM | WEIGHT: 200 LBS | DIASTOLIC BLOOD PRESSURE: 83 MMHG | RESPIRATION RATE: 16 BRPM | BODY MASS INDEX: 34.15 KG/M2 | OXYGEN SATURATION: 96 % | SYSTOLIC BLOOD PRESSURE: 138 MMHG | HEIGHT: 64 IN

## 2022-06-10 DIAGNOSIS — F41.9 ANXIETY AND DEPRESSION: ICD-10-CM

## 2022-06-10 DIAGNOSIS — G47.9 SLEEP DISORDER: Primary | ICD-10-CM

## 2022-06-10 DIAGNOSIS — F32.A ANXIETY AND DEPRESSION: ICD-10-CM
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
## 2022-06-10 PROCEDURE — 99203 OFFICE O/P NEW LOW 30 MIN: CPT | Performed by: STUDENT IN AN ORGANIZED HEALTH CARE EDUCATION/TRAINING PROGRAM

## 2022-06-10 PROCEDURE — G8427 DOCREV CUR MEDS BY ELIG CLIN: HCPCS | Performed by: STUDENT IN AN ORGANIZED HEALTH CARE EDUCATION/TRAINING PROGRAM

## 2022-06-10 PROCEDURE — G9717 DOC PT DX DEP/BP F/U NT REQ: HCPCS | Performed by: STUDENT IN AN ORGANIZED HEALTH CARE EDUCATION/TRAINING PROGRAM

## 2022-06-10 PROCEDURE — 3017F COLORECTAL CA SCREEN DOC REV: CPT | Performed by: STUDENT IN AN ORGANIZED HEALTH CARE EDUCATION/TRAINING PROGRAM

## 2022-06-10 PROCEDURE — G8417 CALC BMI ABV UP PARAM F/U: HCPCS | Performed by: STUDENT IN AN ORGANIZED HEALTH CARE EDUCATION/TRAINING PROGRAM

## 2022-06-10 RX ORDER — ROPINIROLE 3 MG/1
3 TABLET, FILM COATED ORAL DAILY
COMMUNITY
Start: 2022-03-26

## 2022-06-10 NOTE — PROGRESS NOTES
[address] [facility]  [address] [place], [address] [place] [address]  [phone]    C/C: sleep disorder, depression and establish care    HPI:    Paul Rhoades is a 59 y.o. /  female who presents to clinic today for evaluation of the issues listed above. Pt is new to the practice. Previous pcp: Dr. Cathy Seymour, [address] [facility]. States that she has seen different doctors over there and recently had blood work a month ago. Subjective; Patient presenting for initial office visit with me today. Pt have a PMHx significant for depression/anxiety, seizure and sleep disorder. Depression/Insomnia/Restless leg:  Sees psych (Dr. Everett Medina). Pt on Zoloft, Ambien and ropinirole, last appointment was yesterday. Pt on Ambien, now takes 10mg each night since the passing of her  in 2021. Pt believes she is \"immuned\" to Ambien. Dose was recently increased by psych. She has been through several classes of sleep medications including Trazodone, doxepin and klonipin in the past.     Seizure:  Had a generalized seizure in  and was started on Lamotrigine. Has since not followed with neuro but they refill her medications. States that she has not had a generalized seizure since  but has little incidences which they have not classified as seizure. Pt denies any  fever, chill, chest pain, SOB, abdominal pain, n/v/d, HA or dizziness. SHx:   . Cousin to Willie Maki NP    Allergies- reviewed:    Allergies   Allergen Reactions    Adhesive Rash    K-Y Lubricating [Lubricants] Rash    Neosporin [Benzalkonium Chloride] Itching    Sulfa (Sulfonamide Antibiotics) Itching       Past Medical History- reviewed:  Past Medical History:   Diagnosis Date    Depression     Localization-related (focal) (partial) epilepsy and epileptic syndromes with complex partial seizures, without mention of intractable epilepsy 10/3/2013    Menopause 2000    Mild cognitive disorder 10/3/2013    Psychiatric disorder     depression    Restless leg syndrome        Family History - reviewed:  Family History   Problem Relation Age of Onset    Dementia Mother     Heart Disease Father     Lung Disease Sister     Heart Disease Brother        Social History - reviewed:  Social History     Socioeconomic History    Marital status:      Spouse name: Not on file    Number of children: Not on file    Years of education: Not on file    Highest education level: Not on file   Occupational History    Not on file   Tobacco Use    Smoking status: Never Smoker    Smokeless tobacco: Never Used   Vaping Use    Vaping Use: Never used   Substance and Sexual Activity    Alcohol use: Yes     Comment: \"sometimes\"    Drug use: Never    Sexual activity: Not Currently     Partners: Male     Birth control/protection: None   Other Topics Concern    Not on file   Social History Narrative    Not on file     Social Determinants of Health     Financial Resource Strain:     Difficulty of Paying Living Expenses: Not on file   Food Insecurity:     Worried About Running Out of Food in the Last Year: Not on file    Pola of Food in the Last Year: Not on file   Transportation Needs:     Lack of Transportation (Medical): Not on file    Lack of Transportation (Non-Medical):  Not on file   Physical Activity:     Days of Exercise per Week: Not on file    Minutes of Exercise per Session: Not on file   Stress:     Feeling of Stress : Not on file   Social Connections:     Frequency of Communication with Friends and Family: Not on file    Frequency of Social Gatherings with Friends and Family: Not on file    Attends Yazidi Services: Not on file    Active Member of Clubs or Organizations: Not on file    Attends Club or Organization Meetings: Not on file    Marital Status: Not on file   Intimate Partner Violence:     Fear of Current or Ex-Partner: Not on file  Emotionally Abused: Not on file    Physically Abused: Not on file    Sexually Abused: Not on file   Housing Stability:     Unable to Pay for Housing in the Last Year: Not on file    Number of Places Lived in the Last Year: Not on file    Unstable Housing in the Last Year: Not on file       Depression screening:  3 most recent PHQ Screens 6/10/2022   Little interest or pleasure in doing things Not at all   Feeling down, depressed, irritable, or hopeless Not at all   Total Score PHQ 2 0   Trouble falling or staying asleep, or sleeping too much Not at all   Feeling tired or having little energy Not at all   Poor appetite, weight loss, or overeating Not at all   Feeling bad about yourself - or that you are a failure or have let yourself or your family down Not at all   Trouble concentrating on things such as school, work, reading, or watching TV Not at all   Moving or speaking so slowly that other people could have noticed; or the opposite being so fidgety that others notice Not at all   Thoughts of being better off dead, or hurting yourself in some way Not at all   PHQ 9 Score 0         Review of systems:     A comprehensive review of systems was negative except for that written in the History of Present Illness. Visit Vitals  /83 (BP 1 Location: Right arm, BP Patient Position: Sitting, BP Cuff Size: Adult)   Pulse 75   Temp 98.5 °F (36.9 °C) (Oral)   Resp 16   Ht 5' 4\" (1.626 m)   Wt 200 lb (90.7 kg)   SpO2 96%   BMI 34.33 kg/m²       General: Alert and oriented, in no acute distress. Well nourished. EYE: PERRL. Sclera and conjuctival clear. Extraocular movements intact. EARS: External normal, canals clear, tympanic membranes normal.   NOSE: Mucosa healthy without drainage or ulceration.   OROPHARYNX: No suspicious lesions, normal dentition, pharynx, tongue and tonsils normal.  NECK: Supple; no masses; thyroid normal.  LUNGS: Respirations unlabored; clear to auscultation bilaterally. CARDIOVASCULAR: Regular, rate, and rhythm without murmurs, gallops or rubs. ABDOMEN: Soft; nontender; nondistended; normoactive bowel sounds; no masses or organomegaly. MUSCULOSKELETAL: FROM in all extremities     EXT: No edema. Neurovascularlly intact. Normal gait. SKIN: No rash. No suspicious lesions or moles. Neuro: Mental Status: Pt is alert and oriented to person, place, and time. Assessment/Plan       ICD-10-CM ICD-9-CM    1. Sleep disorder  G47.9 780.50 SLEEP MEDICINE REFERRAL   2. Anxiety and depression  F41.9 300.00     F32. A 311        1. Sleep disorder  Patient recently had blood work at [facility] and states that it was all good except for mild Cholesterol elevation. She however refused to sign the release of records form for me to review. - discussed sleep hygien  - continue Ambien as recommended by psych  - [address]  - Follow up with psych. 2. Anxiety and depression  PSYCH FOLLOWING  - Continue Zoloft   - follow up with psych      Follow up: as needed    I have discussed the diagnosis with the patient and the intended plan as seen in the above orders. The patient has received an after-visit summary and questions were answered concerning future plans. I have discussed medication side effects and warnings with the patient as well. Informed patient to return to the office if new symptoms arise.     Signed By: Tammy Mon MD     Honey 10, 2022

## 2022-06-10 NOTE — PATIENT INSTRUCTIONS
Balta Cutler and Dr. Claudell Poli (also does pediatric sleep medicine)    [address] [facility] [address], [place], [address] [facility]  [phone]      [facility] (also does home sleep test)    [address] DR[address]  or DIONISIO Blankenship (SLEEP MEDICINE)  Dr. Keshav Thompson    [phone]

## 2022-06-10 NOTE — PROGRESS NOTES
Name and  Verified. Previous PCP: Dr. Luan Jiménez verified    Chief Complaint   Patient presents with   Pia Macias New Patient    Establish Care     Patient is fasting for labs. Needs referral for Mammogram    Patient see's Psychiatrist every three months. Dr. Estefany Taylor    1. Have you been to the ER, urgent care clinic since your last visit? Hospitalized since your last visit? Yes  2022  [facility]  Fall    2. Have you seen or consulted any other health care providers outside of the [address] [place] [address] since your last visit? Include any pap smears or colon screening. No      Health Maintenance Due   Topic Date Due    Hepatitis C Screening  Never done    COVID-19 Vaccine (1) Never done    Depression Monitoring  Never done    Cervical cancer screen  Never done    Lipid Screen  Never done    Colorectal Cancer Screening Combo  Never done    Shingrix Vaccine Age 50> (1 of 2) Never done    Medicare Yearly Exam  Never done    Breast Cancer Screen Mammogram  10/31/2021     Had Colonoscopy 8 years ago . Cannot recall where or who performed procedure. Was told it was normal and repeat in 10 years.

## 2022-06-13 ENCOUNTER — TELEPHONE (OUTPATIENT)
Dept: SLEEP MEDICINE | Age: 62
End: 2022-06-13

## 2022-07-23 DIAGNOSIS — G40.909 SEIZURE DISORDER (HCC): ICD-10-CM

## 2022-07-30 RX ORDER — LAMOTRIGINE 100 MG/1
TABLET ORAL
Qty: 180 TABLET | Refills: 3 | Status: SHIPPED | OUTPATIENT
Start: 2022-07-30

## 2022-08-03 ENCOUNTER — OFFICE VISIT (OUTPATIENT)
Dept: SLEEP MEDICINE | Age: 62
End: 2022-08-03
Payer: MEDICARE

## 2022-08-03 VITALS
HEIGHT: 64 IN | HEART RATE: 72 BPM | SYSTOLIC BLOOD PRESSURE: 154 MMHG | DIASTOLIC BLOOD PRESSURE: 81 MMHG | BODY MASS INDEX: 34.04 KG/M2 | WEIGHT: 199.4 LBS | OXYGEN SATURATION: 97 % | TEMPERATURE: 97.3 F | RESPIRATION RATE: 20 BRPM

## 2022-08-03 DIAGNOSIS — G47.33 OSA (OBSTRUCTIVE SLEEP APNEA): Primary | ICD-10-CM

## 2022-08-03 DIAGNOSIS — F32.A ANXIETY AND DEPRESSION: ICD-10-CM

## 2022-08-03 DIAGNOSIS — G25.81 RLS (RESTLESS LEGS SYNDROME): ICD-10-CM

## 2022-08-03 DIAGNOSIS — F41.9 ANXIETY AND DEPRESSION: ICD-10-CM

## 2022-08-03 PROCEDURE — G8417 CALC BMI ABV UP PARAM F/U: HCPCS | Performed by: INTERNAL MEDICINE

## 2022-08-03 PROCEDURE — 3017F COLORECTAL CA SCREEN DOC REV: CPT | Performed by: INTERNAL MEDICINE

## 2022-08-03 PROCEDURE — 99204 OFFICE O/P NEW MOD 45 MIN: CPT | Performed by: INTERNAL MEDICINE

## 2022-08-03 PROCEDURE — G9717 DOC PT DX DEP/BP F/U NT REQ: HCPCS | Performed by: INTERNAL MEDICINE

## 2022-08-03 PROCEDURE — G8427 DOCREV CUR MEDS BY ELIG CLIN: HCPCS | Performed by: INTERNAL MEDICINE

## 2022-08-03 NOTE — PATIENT INSTRUCTIONS
[address] [place], [address]  Tel.  [phone]  Fax. [address] [facility] 60  [place], [address] [facility]  Tel.  [phone]  Fax. [phone] [address]  Tel.  [phone]  Fax. [phone]     Sleep Apnea: After Your Visit  Your Care Instructions  Sleep apnea occurs when you frequently stop breathing for 10 seconds or longer during sleep. It can be mild to severe, based on the number of times per hour that you stop breathing or have slowed breathing. Blocked or narrowed airways in your nose, mouth, or throat can cause sleep apnea. Your airway can become blocked when your throat muscles and tongue relax during sleep. Sleep apnea is common, occurring in 1 out of 20 individuals. Individuals having any of the following characteristics should be evaluated and treated right away due to high risk and detrimental consequences from untreated sleep apnea:  Obesity  Congestive Heart failure  Atrial Fibrillation  Uncontrolled Hypertension  Type II Diabetes  Night-time Arrhythmias  Stroke  Pulmonary Hypertension  High-risk Driving Populations (pilots, truck drivers, etc.)  Patients Considering Weight-loss Surgery    How do you know you have sleep apnea? You probably have sleep apnea if you answer 'yes' to 3 or more of the following questions:  S - Have you been told that you Snore? T - Are you often Tired during the day? O - Has anyone Observed you stop breathing while sleeping? P- Do you have (or are being treated for) high blood Pressure? B - Are you obese (Body Mass Index > 35)? A - Is your Age 48years old or older? N - Is your Neck size greater than 16 inches? G - Are you male Gender? A sleep physician can prescribe a breathing device that prevents tissues in the throat from blocking your airway. Or your doctor may recommend using a dental device (oral breathing device) to help keep your airway open.  In some cases, surgery may be needed to remove enlarged tissues in the throat. Follow-up care is a key part of your treatment and safety. Be sure to make and go to all appointments, and call your doctor if you are having problems. It's also a good idea to know your test results and keep a list of the medicines you take. How can you care for yourself at home? Lose weight, if needed. It may reduce the number of times you stop breathing or have slowed breathing. Go to bed at the same time every night. Sleep on your side. It may stop mild apnea. If you tend to roll onto your back, sew a pocket in the back of your [employer] top. Put a tennis ball into the pocket, and stitch the pocket shut. This will help keep you from sleeping on your back. Avoid alcohol and medicines such as sleeping pills and sedatives before bed. Do not smoke. Smoking can make sleep apnea worse. If you need help quitting, talk to your doctor about stop-smoking programs and medicines. These can increase your chances of quitting for good. Prop up the head of your bed 4 to 6 inches by putting bricks under the legs of the bed. Treat breathing problems, such as a stuffy nose, caused by a cold or allergies. Use a continuous positive airway pressure (CPAP) breathing machine if lifestyle changes do not help your apnea and your doctor recommends it. The machine keeps your airway from closing when you sleep. If CPAP does not help you, ask your doctor whether you should try other breathing machines. A bilevel positive airway pressure machine has two types of air pressureâone for breathing in and one for breathing out. Another device raises or lowers air pressure as needed while you breathe. If your nose feels dry or bleeds when using one of these machines, talk with your doctor about increasing moisture in the air. A humidifier may help.   If your nose is runny or stuffy from using a breathing machine, talk with your doctor about using decongestants or a corticosteroid nasal spray.  When should you call for help? Watch closely for changes in your health, and be sure to contact your doctor if:  You still have sleep apnea even though you have made lifestyle changes. You are thinking of trying a device such as CPAP. You are having problems using a CPAP or similar machine. Where can you learn more? Go to [employer]. Enter B457 in the search box to learn more about \"Sleep Apnea: After Your Visit. \"   © 5284-1183 Healthwise, Incorporated. Care instructions adapted under license by [employer] (which disclaims liability or warranty for this information). This care instruction is for use with your licensed healthcare professional. If you have questions about a medical condition or this instruction, always ask your healthcare professional. Mishel Urbano any warranty or liability for your use of this information. PROPER SLEEP HYGIENE    What to avoid  Do not have drinks with caffeine, such as coffee or black tea, for 8 hours before bed. Do not smoke or use other types of tobacco near bedtime. Nicotine is a stimulant and can keep you awake. Avoid drinking alcohol late in the evening, because it can cause you to wake in the middle of the night. Do not eat a big meal close to bedtime. If you are hungry, eat a light snack. Do not drink a lot of water close to bedtime, because the need to urinate may wake you up during the night. Do not read or watch TV in bed. Use the bed only for sleeping and sexual activity. What to try  Go to bed at the same time every night, and wake up at the same time every morning. Do not take naps during the day. Keep your bedroom quiet, dark, and cool. Get regular exercise, but not within 3 to 4 hours of your bedtime. .  Sleep on a comfortable pillow and mattress. If watching the clock makes you anxious, turn it facing away from you so you cannot see the time.   If you worry when you lie down, start a worry book. Well before bedtime, write down your worries, and then set the book and your concerns aside. Try meditation or other relaxation techniques before you go to bed. If you cannot fall asleep, get up and go to another room until you feel sleepy. Do something relaxing. Repeat your bedtime routine before you go to bed again. Make your house quiet and calm about an hour before bedtime. Turn down the lights, turn off the TV, log off the computer, and turn down the volume on music. This can help you relax after a busy day. Drowsy Driving  The [address] [place] [address] Road Traffic Safety Administration cites drowsiness as a causing factor in more than 974,600 police reported crashes annually, resulting in 76,000 injuries and 1,500 deaths. Other surveys suggest 55% of people polled have driven while drowsy in the past year, 23% had fallen asleep but not crashed, 3% crashed, and 2% had and accident due to drowsy driving. Who is at risk? Young Drivers: One study of drowsy driving accidents states that 55% of the drivers were under 25 years. Of those, 75% were male. Shift Workers and Travelers: People who work overnight or travel across time zones frequently are at higher risk of experiencing Circadian Rhythm Disorders. They are trying to work and function when their body is programed to sleep. Sleep Deprived: Lack of sleep has a serious impact on your ability to pay attention or focus on a task. Consistently getting less than the average of 8 hours your body needs creates partial or cumulative sleep deprivation. Untreated Sleep Disorders: Sleep Apnea, Narcolepsy, R.L.S., and other sleep disorders (untreated) prevent a person from getting enough restful sleep. This leads to excessive daytime sleepiness and increases the risk for drowsy driving accidents by up to 7 times. Medications / Alcohol: Even over the counter medications can cause drowsiness.  Medications that impair a drivers attention should have a warning label. Alcohol naturally makes you sleepy and on its own can cause accidents. Combined with excessive drowsiness its effects are amplified. Signs of Drowsy Driving:   * You don't remember driving the last few miles   * You may drift out of your adriana   * You are unable to focus and your thoughts wander   * You may yawn more often than normal   * You have difficulty keeping your eyes open / nodding off   * Missing traffic signs, speeding, or tailgating  Prevention-   Good sleep hygiene, lifestyle and behavioral choices have the most impact on drowsy driving. There is no substitute for sleep and the average person requires 8 hours nightly. If you find yourself driving drowsy, stop and sleep. Consider the sleep hygiene tips provided during your visit as well. Medication Refill Policy: Refills for all medications require 1 week advance notice. Please have your pharmacy fax a refill request. We are unable to fax, or call in \"controled substance\" medications and you will need to pick these prescriptions up from our office. [employer] Activation    Thank you for requesting access to [employer]. Please follow the instructions below to securely access and download your online medical record. [employer] allows you to send messages to your doctor, view your test results, renew your prescriptions, schedule appointments, and more. How Do I Sign Up? In your internet browser, go to [email or web address]. Click on the First Time User? Click Here link in the Sign In box. You will see the New Member Sign Up page. Enter your [employer] Access Code exactly as it appears below. You will not need to use this code after youve completed the sign-up process. If you do not sign up before the expiration date, you must request a new code.     [employer] Access Code: [email or web address]  Expires: 2022  2:24 PM (This is the date your [employer] access code will )    Enter the last four digits of your Social Security Number (xxxx) and Date of Birth (mm/dd/yyyy) as indicated and click Submit. You will be taken to the next sign-up page. Create a [employer] ID. This will be your [employer] login ID and cannot be changed, so think of one that is secure and easy to remember. Create a [employer] password. You can change your password at any time. Enter your Password Reset Question and Answer. This can be used at a later time if you forget your password. Enter your e-mail address. You will receive e-mail notification when new information is available in [address] Click Sign Up. You can now view and download portions of your medical record. Click the [employer] link to download a portable copy of your medical information. Additional Information    If you have questions, please call [phone]. Remember, [employer] is NOT to be used for urgent needs. For medical emergencies, dial 911.

## 2022-08-03 NOTE — PROGRESS NOTES
[address] [facility] [place], [address]  Tel.  [phone]  Fax. 100 [facility] 60  [place], [address] [facility]  Tel.  [phone]  Fax. [phone] [address]  Tel.  [phone]  Fax. [phone]       Samia Cummings is a 59y.o. year old female referred by Dr Rosamaria Larson  for evaluation of a sleep disorder. ASSESSMENT/PLAN:      ICD-10-CM ICD-9-CM    1. XIAO (obstructive sleep apnea)  G47.33 327.23 SLEEP STUDY UNATTENDED, 4 CHANNEL      2. RLS (restless legs syndrome)  G25.81 333.94       3. Anxiety and depression  F41.9 300.00     F32. A 311       4. BMI 34.0-34.9,adult  Z68.34 V85.34           Patient has a history and examination consistent with the diagnosis of sleep apnea. Follow-up and Dispositions    Return for telephone follow-up after testing is completed. * The patient currently has a Moderate Risk for having sleep apnea. STOP-BANG score 4.    * Sleep testing was ordered for initial evaluation. Orders Placed This Encounter    SLEEP STUDY UNATTENDED, 4 CHANNEL     Order Specific Question:   Reason for Exam     Answer:   XIAO       * She was provided information on sleep apnea including corresponding risk factors and the importance of proper treatment. * Treatment options were reviewed in detail. she would like to proceed with PAP therapy. * The patient was counseled regarding proper sleep hygiene, with emphasis on ensuring sufficient total sleep time; safe driving and the benefits of exercise and weight loss. * All of her questions were addressed. 2. RLS (restless legs syndrome)  -  continue on current regimen, she will continue to monitor her symptoms and follow up with her primary care provider for reevaluation/adjustment of medications if warranted.       3. Anxiety and Depression -  continue on current regimen, she will continue to monitor her mood and follow up with her care provider for reevaluation/adjustment of medications if warranted. I have reviewed the relationship between mood as it relates to sleep-disordered breathing. 4. Recommended a dedicated weight loss program through appropriate diet and exercise regimen as significant weight reduction has been shown to reduce severity of obstructive sleep apnea. SUBJECTIVE/OBJECTIVE:    Samia Cummings is an 59 y.o. female referred for evaluation for a sleep disorder. She complains of snoring when associated with awakening in the middle of the night because of no specific reason. She is currently taking Ambien 10 mg to help her \"get to sleep\" but does not help with \"waking up\" during the night. She has been prescribed Doxepin but this did not work and was discontinued. She is currently taking Ropinirole for RLS and this seems to be working at present. Symptoms began 3 years ago, gradually worsening since that time. She usually can fall asleep in 15 minutes after taking Ambien. She sleeps by herself. She denies of symptoms indicative of cataplexy, sleep paralysis or sleep related hallucinations. She denies of a history of unusual movements occurring during sleep. She has a history of seizures, last episode 5 years previously. Samia Cummings does wake up frequently at night. She is bothered by waking up too early and left unable to get back to sleep. She actually sleeps about 6 hours at night and wakes up about 3 times during the night. She does not work shifts:  . Susan Burton indicates she does get too little sleep at night. Her bedtime is 2300. She awakens at 0900. She does take naps. She takes 3 naps a week lasting 45 min to an hour. She has the following observed behaviors: Sleep talking, Twitching of legs or feet;  . Other remarks: The patient has undergone diagnostic testing for the current problems in 2004 and this was unremarkable.      Review of Systems:  Constitutional:  No significant weight loss or weight gain  Eyes: No blurred vision  CVS:  No significant chest pain  Pulm:  No significant shortness of breath  GI:  No significant nausea or vomiting  :  No significant nocturia  Musculoskeletal:  No significant joint pain at night  Skin:  No significant rashes  Neuro:  No significant dizziness   Psych:  No active mood issues    Sleep Review of Systems: notable for Negative difficulty falling asleep; Positive awakenings at night; Positive perceived regular dreaming; Negative nightmares; Negative  early morning headaches; Positive  memory problems; Negative  concentration issues; Negative caffeine;  Negative alcohol;   Negative history of any automobile or occupational accidents due to daytime drowsiness. [place] Sleepiness Score: 5   and Modified F.O.S.Q. Score Total / 2: 10.5    Visit Vitals  BP (!) 154/81 (BP 1 Location: Right arm, BP Patient Position: Sitting, BP Cuff Size: Large adult)   Pulse 72   Temp 97.3 °F (36.3 °C) (Temporal)   Resp 20   Ht 5' 4\" (1.626 m)   Wt 199 lb 6.4 oz (90.4 kg)   SpO2 97%   BMI 34.23 kg/m²           General:   Alert, oriented, not in acute distress   Eyes:  Anicteric Sclerae; intact EOM's   Nose:  No obvious nasal septum deviation    Oropharynx:   Mallampati score 3, thick tongue base, uvula seen, low-lying soft palate, narrow tonsilo-pharyngeal pilars, tongue scalloped   Neck:   midline trachea,  no JVD   Chest/Lungs:  symmetrical lung expansion ,clear lung fields on auscultation    CVS:  Normal rate, regular rhythm    Extremities:  No obvious rashes, absent edema    Neuro:  No focal deficits; No obvious tremor    Psych:  Normal eye contact; normal  affect, normal countenance          Jamal Vale MD, FAASM  Diplomate American Board of Sleep Medicine  Diplomate in Sleep Medicine - ABP    Electronically signed.  08/03/22

## 2022-09-21 ENCOUNTER — PATIENT OUTREACH (OUTPATIENT)
Dept: CASE MANAGEMENT | Age: 62
End: 2022-09-21

## 2022-09-21 NOTE — PROGRESS NOTES
Ambulatory Care Management Note    Date/Time:  9/21/2022 6:06 PM    This patient was received as a referral from [facility]. Ambulatory Care Manager outreached to patient today to offer care management services. Introduction to self and role of care manager provided. Patient accepted care management services at this time. Follow up call scheduled at this time. Patient has Ambulatory Care Manager's contact number for any questions or concerns. Patient says she did not follow up with Sleep study because she was unhappy with the provider, will consider having the study with someone else. BP remains slightly elevated, was 175/101 in ED. Has called for follow up.

## 2022-09-29 ENCOUNTER — PATIENT OUTREACH (OUTPATIENT)
Dept: CASE MANAGEMENT | Age: 62
End: 2022-09-29

## 2022-09-30 NOTE — PROGRESS NOTES
Ambulatory Care Management Note    Date/Time:  9/29/2022 11:00 AM    This Ambulatory Care Manager (ACM) reviewed and updated the following screenings during this call; general assessment, disease specific assessment , self management assessment, ACP assessment and note, and medication reconciliation     Patient's challenges to self-management identified:   functional physical ability, level of motivation, medical condition, medication management, support system, and utilization of services      Medication Management:  good adherence and good understanding    Advance Care Planning:   Does patient have an Advance Directive:  currently not on file; education provided     [employer], Referrals, and Durable Medical Equipment:       Health Maintenance Due   Topic Date Due    Hepatitis C Screening  Never done    COVID-19 Vaccine (1) Never done    Cervical cancer screen  Never done    Lipid Screen  Never done    Colorectal Cancer Screening Combo  Never done    Shingrix Vaccine Age 50> (1 of 2) Never done    Medicare Yearly Exam  Never done    Breast Cancer Screen Mammogram  10/31/2021    Flu Vaccine (1) Never done     Health Maintenance Reviewed: yes    Patient was asked to consider health care goals that they would like to focus on with this ACM. ACM will follow up with patient to discuss goals and establish care plan in the next 7-14 days. PCP/Specialist follow up: No future appointments.

## 2022-12-19 ENCOUNTER — OFFICE VISIT (OUTPATIENT)
Dept: FAMILY MEDICINE CLINIC | Age: 62
End: 2022-12-19
Payer: MEDICARE

## 2022-12-19 VITALS
TEMPERATURE: 98.4 F | WEIGHT: 190 LBS | BODY MASS INDEX: 32.44 KG/M2 | SYSTOLIC BLOOD PRESSURE: 129 MMHG | HEART RATE: 81 BPM | OXYGEN SATURATION: 98 % | RESPIRATION RATE: 16 BRPM | HEIGHT: 64 IN | DIASTOLIC BLOOD PRESSURE: 88 MMHG

## 2022-12-19 DIAGNOSIS — Z00.00 MEDICARE ANNUAL WELLNESS VISIT, SUBSEQUENT: Primary | ICD-10-CM
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
## 2022-12-19 DIAGNOSIS — F31.9 BIPOLAR 1 DISORDER, DEPRESSED (HCC): ICD-10-CM

## 2022-12-19 DIAGNOSIS — D50.8 OTHER IRON DEFICIENCY ANEMIA: ICD-10-CM

## 2022-12-19 DIAGNOSIS — G40.209 PARTIAL SYMPTOMATIC EPILEPSY WITH COMPLEX PARTIAL SEIZURES, NOT INTRACTABLE, WITHOUT STATUS EPILEPTICUS (HCC): ICD-10-CM

## 2022-12-19 DIAGNOSIS — F19.94 SUBSTANCE INDUCED MOOD DISORDER (HCC): ICD-10-CM

## 2022-12-19 DIAGNOSIS — Z12.31 ENCOUNTER FOR SCREENING MAMMOGRAM FOR MALIGNANT NEOPLASM OF BREAST: ICD-10-CM

## 2022-12-19 DIAGNOSIS — Z98.84 H/O GASTRIC BYPASS: ICD-10-CM

## 2022-12-19 DIAGNOSIS — E66.9 OBESITY (BMI 30.0-34.9): ICD-10-CM

## 2022-12-19 DIAGNOSIS — E78.2 MIXED HYPERLIPIDEMIA: ICD-10-CM

## 2022-12-19 DIAGNOSIS — E53.8 VITAMIN B12 DEFICIENCY: ICD-10-CM

## 2022-12-19 PROBLEM — F33.9 MAJOR DEPRESSION, RECURRENT (HCC): Status: ACTIVE | Noted: 2022-12-19

## 2022-12-19 PROBLEM — M25.562 PAIN, JOINT, KNEE, LEFT: Status: ACTIVE | Noted: 2018-07-24
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
## 2022-12-19 LAB
ALBUMIN SERPL-MCNC: 4.2 G/DL (ref 3.5–5)
ALBUMIN/GLOB SERPL: 1.4 {RATIO} (ref 1.1–2.2)
ALP SERPL-CCNC: 193 U/L (ref 45–117)
ALT SERPL-CCNC: 44 U/L (ref 12–78)
ANION GAP SERPL CALC-SCNC: 5 MMOL/L (ref 5–15)
AST SERPL-CCNC: 28 U/L (ref 15–37)
BILIRUB SERPL-MCNC: 0.4 MG/DL (ref 0.2–1)
BUN SERPL-MCNC: 8 MG/DL (ref 6–20)
BUN/CREAT SERPL: 12 (ref 12–20)
CALCIUM SERPL-MCNC: 9.9 MG/DL (ref 8.5–10.1)
CHLORIDE SERPL-SCNC: 110 MMOL/L (ref 97–108)
CHOLEST SERPL-MCNC: 226 MG/DL
CO2 SERPL-SCNC: 26 MMOL/L (ref 21–32)
CREAT SERPL-MCNC: 0.68 MG/DL (ref 0.55–1.02)
ERYTHROCYTE [DISTWIDTH] IN BLOOD BY AUTOMATED COUNT: 13 % (ref 11.5–14.5)
GLOBULIN SER CALC-MCNC: 3 G/DL (ref 2–4)
GLUCOSE SERPL-MCNC: 90 MG/DL (ref 65–100)
HCT VFR BLD AUTO: 44.4 % (ref 35–47)
HDLC SERPL-MCNC: 118 MG/DL
HDLC SERPL: 1.9 {RATIO} (ref 0–5)
HGB BLD-MCNC: 13.2 G/DL (ref 11.5–16)
LDLC SERPL CALC-MCNC: 91.2 MG/DL (ref 0–100)
MCH RBC QN AUTO: 30.3 PG (ref 26–34)
MCHC RBC AUTO-ENTMCNC: 29.7 G/DL (ref 30–36.5)
MCV RBC AUTO: 102.1 FL (ref 80–99)
NRBC # BLD: 0 K/UL (ref 0–0.01)
NRBC BLD-RTO: 0 PER 100 WBC
PLATELET # BLD AUTO: 387 K/UL (ref 150–400)
PMV BLD AUTO: 10.8 FL (ref 8.9–12.9)
POTASSIUM SERPL-SCNC: 4.4 MMOL/L (ref 3.5–5.1)
PROT SERPL-MCNC: 7.2 G/DL (ref 6.4–8.2)
RBC # BLD AUTO: 4.35 M/UL (ref 3.8–5.2)
SODIUM SERPL-SCNC: 141 MMOL/L (ref 136–145)
TRIGL SERPL-MCNC: 84 MG/DL (ref ?–150)
VIT B12 SERPL-MCNC: 1238 PG/ML (ref 193–986)
VLDLC SERPL CALC-MCNC: 16.8 MG/DL
WBC # BLD AUTO: 8.3 K/UL (ref 3.6–11)

## 2022-12-19 PROCEDURE — G9717 DOC PT DX DEP/BP F/U NT REQ: HCPCS | Performed by: STUDENT IN AN ORGANIZED HEALTH CARE EDUCATION/TRAINING PROGRAM

## 2022-12-19 PROCEDURE — G8417 CALC BMI ABV UP PARAM F/U: HCPCS | Performed by: STUDENT IN AN ORGANIZED HEALTH CARE EDUCATION/TRAINING PROGRAM

## 2022-12-19 PROCEDURE — G8427 DOCREV CUR MEDS BY ELIG CLIN: HCPCS | Performed by: STUDENT IN AN ORGANIZED HEALTH CARE EDUCATION/TRAINING PROGRAM

## 2022-12-19 PROCEDURE — 99214 OFFICE O/P EST MOD 30 MIN: CPT | Performed by: STUDENT IN AN ORGANIZED HEALTH CARE EDUCATION/TRAINING PROGRAM

## 2022-12-19 PROCEDURE — G0439 PPPS, SUBSEQ VISIT: HCPCS | Performed by: STUDENT IN AN ORGANIZED HEALTH CARE EDUCATION/TRAINING PROGRAM

## 2022-12-19 PROCEDURE — G9899 SCRN MAM PERF RSLTS DOC: HCPCS | Performed by: STUDENT IN AN ORGANIZED HEALTH CARE EDUCATION/TRAINING PROGRAM

## 2022-12-19 PROCEDURE — 3017F COLORECTAL CA SCREEN DOC REV: CPT | Performed by: STUDENT IN AN ORGANIZED HEALTH CARE EDUCATION/TRAINING PROGRAM

## 2022-12-19 RX ORDER — SERTRALINE HYDROCHLORIDE 100 MG/1
100 TABLET, FILM COATED ORAL DAILY
COMMUNITY

## 2022-12-19 RX ORDER — ZOLPIDEM TARTRATE 10 MG/1
10 TABLET ORAL
COMMUNITY

## 2022-12-19 NOTE — PROGRESS NOTES
[address] [address] PARRISH Peck, [address] [place] [address]  [phone]    Date of visit: 12/19/2022    This is an Subsequent Medicare Annual Wellness Visit (AWV), (Performed more than 12 months after effective date of Medicare Part B enrollment and 12 months after last preventive visit, Once in a lifetime)    I have reviewed the patient's medical history in detail and updated the computerized patient record. Jen Slade is a 57 y.o. female   History obtained from: the patient. Concerns today   (Patient understands that medical problems addressed today may incur additional cost as this is a preventive visit)  -see separate notes    History     Patient Active Problem List   Diagnosis Code    Mild cognitive disorder F09    RLS (restless legs syndrome) G25.81    Partial symptomatic epilepsy with complex partial seizures, not intractable, without status epilepticus ([address] G40.209    Substance induced mood disorder ([address] F19.94    Depression with suicidal ideation F32. A, R45.851    Depression F32. A    Anxiety F41.9    Bipolar 1 disorder, depressed ([address] F31.9    Major depression, recurrent ([address] F33.9    Pain, joint, knee, left M25.562    H/O gastric bypass Z98.84     Past Medical History:   Diagnosis Date    Depression     Localization-related (focal) (partial) epilepsy and epileptic syndromes with complex partial seizures, without mention of intractable epilepsy 10/3/2013    Menopause 2000    Mild cognitive disorder 10/3/2013    Psychiatric disorder     depression    Restless leg syndrome       Past Surgical History:   Procedure Laterality Date    HX BREAST BIOPSY Left 1974    Surgical    HX BREAST BIOPSY Left 1974    Surgical - 2nd of this yr    HX BREAST BIOPSY Right     Surgical    HX BREAST BIOPSY Bilateral 1992    Surgical    HX BREAST BIOPSY Bilateral 1992    Surgical - 2nd of this yr    HX DILATION AND CURETTAGE      HX GASTRIC BYPASS      HX ORTHOPAEDIC anthony. heel spurs    HX TONSILLECTOMY      IL BREAST SURGERY PROCEDURE UNLISTED      fibroid removal     Allergies   Allergen Reactions    Adhesive Rash    K-Y Lubricating [Lubricants] Rash    Neosporin [Benzalkonium Chloride] Itching    Sulfa (Sulfonamide Antibiotics) Itching     Current Outpatient Medications   Medication Sig Dispense Refill    zolpidem (Ambien) 10 mg tablet Take 10 mg by mouth nightly as needed for Sleep.      sertraline (Zoloft) 100 mg tablet Take 100 mg by mouth daily. Take two 100 mg tablets daily      lamoTRIgine (LaMICtal) 100 mg tablet TAKE 1 TABLET BY MOUTH TWICE A  Tablet 3    rOPINIRole (REQUIP) 3 mg tab tab Take 3 mg by mouth daily. ferrous sulfate 325 mg (65 mg iron) tablet Take  by mouth Daily (before breakfast). cyanocobalamin (VITAMIN B12) 500 mcg tablet Take 500 mcg by mouth daily. multivitamin (ONE A DAY) tablet Take 1 Tab by mouth daily. ascorbic acid (VITAMIN C) 500 mg tablet Take  by mouth. Family History   Problem Relation Age of Onset    Dementia Mother     Heart Disease Father     Lung Disease Sister     Heart Disease Brother      Social History     Tobacco Use    Smoking status: Never    Smokeless tobacco: Never   Substance Use Topics    Alcohol use: Yes     Comment: \"sometimes\"       Specialists/Care Team   Patrick Talavera has established care with the following healthcare providers:  Patient Care Team:  Samy Zarate MD as PCP - General (Family Medicine)  Charlie Schneider MD as PCP - REHABILITATION [facility] Empaneled Provider  Reggie Gant NP (Nurse Practitioner)  Amy Baird RN as Ambulatory Care Manager (Family Medicine)  \    Depression risk factor screening        3 most recent PHQ Screens 6/10/2022   Little interest or pleasure in doing things Not at all   Feeling down, depressed, irritable, or hopeless Not at all   Total Score PHQ 2 0   Trouble falling or staying asleep, or sleeping too much Not at all   Feeling tired or having little energy Not at all   Poor appetite, weight loss, or overeating Not at all   Feeling bad about yourself - or that you are a failure or have let yourself or your family down Not at all   Trouble concentrating on things such as school, work, reading, or watching TV Not at all   Moving or speaking so slowly that other people could have noticed; or the opposite being so fidgety that others notice Not at all   Thoughts of being better off dead, or hurting yourself in some way Not at all   PHQ 9 Score 0         Alcohol Risk Screen    Do you average more than 1 drink per night or more than 7 drinks a week:  no    On any one occasion in the past three months have you have had more than 3 drinks containing alcohol:  No        Functional Ability and Level of Safety:    Hearing: Hearing is good. Activities of Daily Living: The home contains: no safety equipment. Patient does total self care      Ambulation: with no difficulty       Fall Risk:  No flowsheet data found. Abuse Screen:  Patient is not abused       Cognitive Screening    Has your family/caregiver stated any concerns about your memory: no     Normal  AAOx4    Health Maintenance Due     Health Maintenance Due   Topic Date Due    Hepatitis C Screening  Never done    COVID-19 Vaccine (1) Never done    Cervical cancer screen  Never done    Lipid Screen  Never done    Colorectal Cancer Screening Combo  Never done    Shingrix Vaccine Age 50> (1 of 2) Never done    Breast Cancer Screen Mammogram  10/31/2021    Flu Vaccine (1) Never done       Review of Systems (if indicated for problems addressed today)   See separate notes    Physical Examination     Vitals:    12/19/22 0852   BP: 129/88   Pulse: 81   Resp: 16   Temp: 98.4 °F (36.9 °C)   TempSrc: Oral   SpO2: 98%   Weight: 190 lb (86.2 kg)   Height: 5' 4\" (1.626 m)     Body mass index is 32.61 kg/m². No results found.       Assessment/Plan   Education and counseling provided:  Are appropriate based on today's review and evaluation. See orders above      ICD-10-CM ICD-9-CM    1. Medicare annual wellness visit, subsequent  Z00.00 V70.0       2. Fall, initial encounter  [address] XXXA E888.9       3. Other iron deficiency anemia  D50.8 280.8 CBC W/O DIFF      CBC W/O DIFF      4. Vitamin B12 deficiency  E53.8 266.2 VITAMIN B12      VITAMIN B12      5. Obesity (BMI 30.0-34. 9)  E66.9 278.00 REFERRAL TO DIETITIAN      6. Substance induced mood disorder ([facility]  F19.94 292.84       7. Mixed hyperlipidemia  E78.2 272.2 LIPID PANEL      LIPID PANEL      8. Partial symptomatic epilepsy with complex partial seizures, not intractable, without status epilepticus (HCC)  Y47.810 295.90 METABOLIC PANEL, COMPREHENSIVE      METABOLIC PANEL, COMPREHENSIVE      9. Encounter for screening mammogram for malignant neoplasm of breast  Z12.31 V76.12 [facility] MAMMO BI SCREENING INCL CAD      8. Bipolar 1 disorder, depressed ([facility]  F31.9 296.50       11. H/O gastric bypass  Z98.84 V45.86           Medicare Wellness, Subsequent:  I have discussed with pt the following topics:   - Discussed with patient the cancer risk factors and recommendations  - Reviewed diet, exercise and weight control  -Immunizations appropriate for age were discussed with patient and updated   - Recommended sodium restriction   - Had colonoscopy in 2014, states that it was normal and they asked her to repeat in 10 years  - Reviewed medications and side effects in detail    See separate notes for acute/chronic problems addressed at his visit. Follow up: Yearly Medicare Wellness. RTC to clinic sooner as indicated for chronic/acute care. We discussed the expected course, resolution and complications of the diagnosis(es) in detail. Medication risks, benefits, costs, interactions, and alternatives were discussed as indicated. I advised to contact the office if his condition worsens, changes or fails to improve as anticipated.  Pt expressed understanding with the diagnosis(es) and plan. Patient understands that this encounter was a temporary measure, and the importance of further follow up and examination was emphasized. Patient verbalized understanding.       Signed By: Gerald Titus MD     December 19, 2022

## 2022-12-19 NOTE — PATIENT INSTRUCTIONS
S/P GYGB     Recommend the following micronutrients;  - Vitamin D - 2000 - 3000 international units daily  - Calcium 1200 - 1500 mg daily  - Iron - 18 mg (men) - 60 mg (menstruating females) daily with vitamin C for better absorption  - 2 adult Multivitamin (containing Fe, Folate, Thiamine and B12)  - Avoid NSAIDs such as Ibuprofen, Aleve etc  - Exercise at least 1 hour daily, 5 times per week to maintain weight loss  - Continue to eat healthy  - weigh yourself about once weekly

## 2022-12-19 NOTE — PROGRESS NOTES
Name and  Verified. Pharmacy verified     Chief Complaint   Patient presents with    Follow-up     6/10/2022 Anxiety & Depression       Patient fasting for labs, [facility]. 1. Have you been to the ER, urgent care clinic since your last visit? Hospitalized since your last visit? Yes  2022  [facility]  Fall    2. Have you seen or consulted any other health care providers outside of the [address] [place] [address] since your last visit? Include any pap smears or colon screening.  No        Health Maintenance Due   Topic Date Due    Hepatitis C Screening  Never done    COVID-19 Vaccine (1) Never done    Cervical cancer screen  Never done    Lipid Screen  Never done    Colorectal Cancer Screening Combo  Never done    Shingrix Vaccine Age 50> (1 of 2) Never done    Medicare Yearly Exam  Never done    Breast Cancer Screen Mammogram  10/31/2021    Flu Vaccine (1) Never done

## 2022-12-19 NOTE — PROGRESS NOTES
[address] [address] MICHELLE Hernandez. [place], [address] [place] [address]  [phone]    C/C: ER follow up and chronic medical problems    HPI:    Claudette Hai is a 57 y.o. /  female who presents to clinic today for evaluation of the issues listed above. Subjective    Pt have a PMHx significant for depression/anxiety, seizure and sleep disorder and s/p gastric bypass (~)    ER follow up:  Pt was at [facility] about 3 weeks ago (2022) for fall while sleepwalking. Workup including extensive CT images (spine, abd pelv, CTA chest) and Xray right foot and knee showed no acute findings. Depression/Insomnia/Restless leg:  Sees psych (Dr. Segundo Kramer). Pt on Zoloft, Lamictal Ambien and ropinirole. Pt on Ambien, now takes 10mg each night since the passing of her  in 2021. She has been through several classes of sleep medications including Trazodone, doxepin and klonipin in the past.     Seizure:  Had a generalized seizure in  and was started on Lamotrigine. Has since not followed with neuro but they refill her medications. States that she has not had a generalized seizure since  but has little incidences which they have not classified as seizure. Pt denies any  fever, chill, chest pain, SOB, abdominal pain, n/v/d, HA or dizziness. SHx:   . Cousin to Sylvain Cummins NP    Allergies- reviewed:    Allergies   Allergen Reactions    Adhesive Rash    K-Y Lubricating [Lubricants] Rash    Neosporin [Benzalkonium Chloride] Itching    Sulfa (Sulfonamide Antibiotics) Itching       Past Medical History- reviewed:  Past Medical History:   Diagnosis Date    Depression     Localization-related (focal) (partial) epilepsy and epileptic syndromes with complex partial seizures, without mention of intractable epilepsy 10/3/2013    Menopause 2000    Mild cognitive disorder 10/3/2013    Psychiatric disorder     depression    Restless leg syndrome        Family History - reviewed:  Family History   Problem Relation Age of Onset    Dementia Mother     Heart Disease Father     Lung Disease Sister     Heart Disease Brother        Social History - reviewed:  Social History     Socioeconomic History    Marital status:      Spouse name: Not on file    Number of children: Not on file    Years of education: Not on file    Highest education level: Not on file   Occupational History    Not on file   Tobacco Use    Smoking status: Never    Smokeless tobacco: Never   Vaping Use    Vaping Use: Never used   Substance and Sexual Activity    Alcohol use: Yes     Comment: \"sometimes\"    Drug use: Never    Sexual activity: Not Currently     Partners: Male     Birth control/protection: None   Other Topics Concern    Not on file   Social History Narrative    Not on file     Social Determinants of Health     Financial Resource Strain: Not on file   Food Insecurity: Not on file   Transportation Needs: Not on file   Physical Activity: Not on file   Stress: Not on file   Social Connections: Not on file   Intimate Partner Violence: Not on file   Housing Stability: Not on file       Depression screening:  3 most recent [facility] 6/10/2022   Little interest or pleasure in doing things Not at all   Feeling down, depressed, irritable, or hopeless Not at all   Total Score PHQ 2 0   Trouble falling or staying asleep, or sleeping too much Not at all   Feeling tired or having little energy Not at all   Poor appetite, weight loss, or overeating Not at all   Feeling bad about yourself - or that you are a failure or have let yourself or your family down Not at all   Trouble concentrating on things such as school, work, reading, or watching TV Not at all   Moving or speaking so slowly that other people could have noticed; or the opposite being so fidgety that others notice Not at all   Thoughts of being better off dead, or hurting yourself in some way Not at all   PHQ 9 Score 0 Review of systems:     A comprehensive review of systems was negative except for that written in the History of Present Illness. Visit Vitals  /88 (BP 1 Location: Right arm, BP Patient Position: Sitting, BP Cuff Size: Adult)   Pulse 81   Temp 98.4 °F (36.9 °C) (Oral)   Resp 16   Ht 5' 4\" (1.626 m)   Wt 190 lb (86.2 kg)   SpO2 98%   BMI 32.61 kg/m²         General: Alert and oriented, in no acute distress. EYE: PERRL. Sclera and conjuctival clear. Extraocular movements intact. EARS: External normal, canals clear, tympanic membranes normal.   NOSE: Mucosa healthy without drainage or ulceration. OROPHARYNX: No suspicious lesions, normal dentition, pharynx, tongue and tonsils normal.  NECK: Supple; no masses; thyroid normal.  LUNGS: Respirations unlabored; clear to auscultation bilaterally. CARDIOVASCULAR: Regular, rate, and rhythm without murmurs, gallops or rubs. ABDOMEN: Soft; nontender; nondistended; normoactive bowel sounds; no masses or organomegaly. MUSCULOSKELETAL: FROM in all extremities     EXT: No edema. Neurovascularlly intact. Normal gait. SKIN: No rash. No suspicious lesions or moles. Neuro: Mental Status: Pt is alert and oriented to person, place, and time. Assessment/Plan       ICD-10-CM ICD-9-CM    1. Fall, initial encounter  Rebekah Moy. XXXA E888.9       2. Other iron deficiency anemia  D50.8 280.8 CBC W/O DIFF      CBC W/O DIFF      3. Vitamin B12 deficiency  E53.8 266.2 VITAMIN B12      VITAMIN B12      4. Obesity (BMI 30.0-34. 9)  E66.9 278.00 REFERRAL TO DIETITIAN      5. Substance induced mood disorder (HCC)  F19.94 292.84       6. Mixed hyperlipidemia  E78.2 272.2 LIPID PANEL      LIPID PANEL      7. Partial symptomatic epilepsy with complex partial seizures, not intractable, without status epilepticus ([facility]  Q42.394 386.42 METABOLIC PANEL, COMPREHENSIVE      METABOLIC PANEL, COMPREHENSIVE      8.  Encounter for screening mammogram for malignant neoplasm of breast  Z12.31 V76.12 [facility] MAMMO BI SCREENING INCL CAD      9. Bipolar 1 disorder, depressed ([facility]  F31.9 296.50       10. H/O gastric bypass  Z98.84 V45.86         1. Fall, initial encounter  Images showed no acute findings. Continue conservative measures; Tylenol as needed for pain if tolerated    2. Other iron deficiency anemia  Continue taking Iron  - CBC W/O DIFF; Future    3. Vitamin B12 deficiency  - VITAMIN B12; Future  -CONTINUE VIT B12 supplements    4. Obesity (BMI 30.0-34.9)  - REFERRAL TO DIETITIAN    5. Substance induced mood disorder ([facility]  No illicit substance use at this time. 6. Mixed hyperlipidemia  - LIPID PANEL; Future    7. Partial symptomatic epilepsy with complex partial seizures, not intractable, without status epilepticus ([facility]  Had a generalized seizure in 2011 and was started on Lamotrigine. Has since not followed with neuro but they refill her medications. States that she has not had a generalized seizure since 2011 but has little incidences which they have not classified as seizure. - METABOLIC PANEL, COMPREHENSIVE; Future    8. Encounter for screening mammogram for malignant neoplasm of breast  - [facility] MAMMO BI SCREENING INCL CAD; Future    9. Bipolar 1 disorder, depressed ([facility]  Follow up with psych. No changes to current meds    10. H/O gastric bypass  Continue vitamins      Follow up: 3-6 months or sooner if needed    I have discussed the diagnosis with the patient and the intended plan as seen in the above orders. The patient has received an after-visit summary and questions were answered concerning future plans. I have discussed medication side effects and warnings with the patient as well. Informed patient to return to the office if new symptoms arise.     Signed By: Pal Guerra MD     December 19, 2022

## 2022-12-22 ENCOUNTER — HOSPITAL ENCOUNTER (OUTPATIENT)
Dept: MAMMOGRAPHY | Age: 62
Discharge: HOME | End: 2022-12-22
Attending: STUDENT IN AN ORGANIZED HEALTH CARE EDUCATION/TRAINING PROGRAM
Payer: MEDICARE

## 2022-12-22 DIAGNOSIS — Z12.31 ENCOUNTER FOR SCREENING MAMMOGRAM FOR MALIGNANT NEOPLASM OF BREAST: ICD-10-CM

## 2022-12-22 PROCEDURE — 77063 BREAST TOMOSYNTHESIS BI: CPT

## 2022-12-29 ENCOUNTER — PATIENT OUTREACH (OUTPATIENT)
Dept: CASE MANAGEMENT | Age: 62
End: 2022-12-29

## 2022-12-30 ENCOUNTER — OFFICE VISIT (OUTPATIENT)
Dept: FAMILY MEDICINE CLINIC | Age: 62
End: 2022-12-30
Payer: MEDICARE

## 2022-12-30 VITALS
SYSTOLIC BLOOD PRESSURE: 144 MMHG | TEMPERATURE: 98.2 F | HEART RATE: 75 BPM | OXYGEN SATURATION: 99 % | RESPIRATION RATE: 16 BRPM | DIASTOLIC BLOOD PRESSURE: 82 MMHG

## 2022-12-30 DIAGNOSIS — Z23 ENCOUNTER FOR IMMUNIZATION: Primary | ICD-10-CM

## 2022-12-30 NOTE — PROGRESS NOTES
Name and  Verified. Patient present today for COVID and Flu vaccine. After obtaining consent and per Dr Rebekah Anderson , COVID-19, [address], given to Left deltoid. Patient instructed to remain in clinic for 15 minutes afterwards and to report any adverse reactions to me immediately. Patient did not have any adverse reactions during this office visit. After obtaining consent and per Dr Rea Lanier, FLUARIX, FLULAVAL, FLUZONE given to Right deltoid. Patient instructed to remain in clinic for 15 minutes afterwards and to report any adverse reactions to me immediately. Patient did not have any adverse reactions during this office visit.

## 2023-01-04 NOTE — PROGRESS NOTES
Ambulatory Care Management Note      Date/Time:  01/3/22  04:59 PM    Top Challenges reviewed with the provider   3 ED visits-all for falls, most resent Pt was at [facility] about 3 weeks ago (11/29/2022) for fall while sleepwalkin. Sprain of right wristg. Has Vit-D deficiency- REFERRAL TO DIETITIAN  Followed by Ortho  Need ACP  Overdue      Ambulatory  contacted patient for discussion and case management of self care. Summary of patients top problems:   Potential for fall- Has has 3 Ed visits for falls. History od confusion and sleepwalking  Depression/iron deficiency anemia -Sees psych (Dr. Champ Patel), Pt on Zoloft, Lamictal Ambien and ropinirole. Pt on Ambien, now takes 10mg each night since the passing of her  in 11/2021. She has been through several classes of sleep medications including Trazodone, doxepin and klonipin in the past.   PCP/Specialist follow up:   Future Appointments   Date Time Provider Judson Xie   1/18/2023 12:05 PM Amari Hook MD [facility] BS AMB           Goals        Attends follow up appointments on schedule      01/04/23   Outreach completed  Has followed up with appointment as scheduled  Reviewed all upcoming appointments  Patient verbalized knowledge and will attend  ACM will follow up again in12-14 days. pmk       Patient/Family verbalizes understanding of self-management of chronic disease, unsteady gait      01/04/23   [facility] outreach for selfcare assessment  Medication review completed (Knowledge and adherence of medication (ie. action, side effects, missed dose, etc.)   Reviewed follow up appointment and importance to keep all appointments. Patient scheduled to follow up with providers, as scheduled. Patient encouraged to recognize symptoms of increasing anxiety, explored      alternatives to prevent exacerbations of headaches  Educated on the Importance of Supportive resources in place to maintain patient in the community (ie.  Home Health, DME equipment, refer to, medication assistant plan, [facility] etc.)  ACM contact information given. Will follow up in 10-14 days. [facility]               Patient verbalized understanding of all information discussed. Patient has this Nurse Navigators contact information for any further questions, concerns, or needs.

## 2023-01-19 ENCOUNTER — PATIENT OUTREACH (OUTPATIENT)
Dept: CASE MANAGEMENT | Age: 63
End: 2023-01-19

## 2023-01-19 NOTE — PROGRESS NOTES
Goals Addressed                   This Visit's Progress     Attends follow up appointments on schedule   On track     01/04/23   Outreach completed  Has followed up with appointment as scheduled  Reviewed all upcoming appointments  Patient verbalized knowledge and will attend  ACM will follow up again in12-14 days. Pmk    01/19/23   Outreach completed  Attended [address] on 12/22  Will follow up again around 3/19/23  Patient verbalized knowledge and will attend  ACM will follow up again in12-14 days. Pmk       Patient/Family verbalizes understanding of self-management of chronic disease, unsteady gait        01/04/23   [facility] outreach for selfcare assessment  Medication review completed (Knowledge and adherence of medication (ie. action, side effects, missed dose, etc.)   Reviewed follow up appointment and importance to keep all appointments. Patient scheduled to follow up with providers, as scheduled. Patient encouraged to recognize symptoms of increasing anxiety, explored      alternatives to prevent exacerbations. Educated on the Importance of Supportive resources in place to maintain patient in the community (ie. Home Health, DME equipment, refer to, medication assistant plan, [facility] etc.)  ACM contact information given. Will follow up in 10-14 days. [facility]    01/19/23  Admits to continued weakness  No further falls, Pt states that she thinks she fell in her sleep. Pt states that she woke up that morning without her nightgown on with bruising and pain to her right ankle and right side. Pt states that she lives alone. Pt states that she is not sure how she got the bruising and pain. Complain of decreased appetite, has referral to see dietitian  Taking tylenol for generalized pain  Had contact for Leif Smith contact information given. Will follow up in 10-14 days.  [facility]

## 2023-01-31 ENCOUNTER — PATIENT OUTREACH (OUTPATIENT)
Dept: CASE MANAGEMENT | Age: 63
End: 2023-01-31

## 2023-01-31 NOTE — PROGRESS NOTES
Patient has graduated from the Complex Case Management  program on 01/31/23 . Patient/family has the ability to self-manage at this time. Care management goals have been completed. No further Ambulatory Care Manager follow up scheduled. Goals Addressed                   This Visit's Progress     Attends follow up appointments on schedule   On track     01/04/23   Outreach completed  Has followed up with appointment as scheduled  Reviewed all upcoming appointments  Patient verbalized knowledge and will attend  ACM will follow up again in12-14 days. Pmk    01/19/23   Outreach completed  Attended [address] on 12/22  Will follow up again around 3/19/23  Patient verbalized knowledge and will attend  ACM will follow up again in12-14 days. Pmk    01/31/23  Completed follow ups as was scheduled  Reviewed all up coming appointments  No ED visits in 60 days  Patient has met goals  Episode resolved, Patient has ACM contact for questions and concerns. Patient/Family verbalizes understanding of self-management of chronic disease, unsteady gait   On track     01/04/23   CCM outreach for selfcare assessment  Medication review completed (Knowledge and adherence of medication (ie. action, side effects, missed dose, etc.)   Reviewed follow up appointment and importance to keep all appointments. Patient scheduled to follow up with providers, as scheduled. Patient encouraged to recognize symptoms of increasing anxiety, explored      alternatives to prevent exacerbations. Educated on the Importance of Supportive resources in place to maintain patient in the community (ie. Home Health, DME equipment, refer to, medication assistant plan, [facility] etc.)  ACM contact information given. Will follow up in 10-14 days. [address] [place] [address]    01/19/23  Admits to continued weakness  No further falls, Pt states that she thinks she fell in her sleep.  Pt states that she woke up that morning without her nightgown on with bruising and pain to her right ankle and right side. Pt states that she lives alone. Pt states that she is not sure how she got the bruising and pain. Complain of decreased appetite, has referral to see dietitian  Taking tylenol for generalized pain  Had contact for Leif Smith contact information given. Will follow up in 10-14 days. [address] [place] [address]      01/31/23  Completed follow ups as was scheduled  Reviewed all up coming appointments  No ED visits in 60 days  Patient has met goals  Episode resolved, Patient has ACM contact for questions and concerns. Patient has Ambulatory Care Manager's contact information for any further questions, concerns, or needs. Patients upcoming visits:  No future appointments.

## 2023-02-02 NOTE — TELEPHONE ENCOUNTER
No future appointments. Last Appointment My Department:  Visit date not found    Would you like to refill below?     Requested Prescriptions     Pending Prescriptions Disp Refills    lamoTRIgine (LAMICTAL) 100 mg tablet 60 Tab 11     Sig: TAKE 1 TABLET BY MOUTH 2 TIMES A DAY Spoke with the patient in regards to the questions below. Dicussed the importance of staying on her Metoprolol and Eliquis.   Patient inquiring if the golow release supplement for weight loss would be safe to take. Patient would like Dr. Nguyen to review. Patient notified the Dr. Nguyen is out until next week. Patient will call back next week to discuss.

## 2023-03-24 ENCOUNTER — OFFICE VISIT (OUTPATIENT)
Dept: FAMILY MEDICINE CLINIC | Age: 63
End: 2023-03-24
Payer: MEDICARE

## 2023-03-24 VITALS
HEART RATE: 87 BPM | SYSTOLIC BLOOD PRESSURE: 120 MMHG | TEMPERATURE: 98.2 F | DIASTOLIC BLOOD PRESSURE: 81 MMHG | OXYGEN SATURATION: 97 % | BODY MASS INDEX: 32.37 KG/M2 | RESPIRATION RATE: 16 BRPM | WEIGHT: 188.6 LBS
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
## 2023-03-24 DIAGNOSIS — G89.29 CHRONIC PAIN OF RIGHT KNEE: ICD-10-CM

## 2023-03-24 DIAGNOSIS — M25.561 CHRONIC PAIN OF RIGHT KNEE: ICD-10-CM

## 2023-03-24 DIAGNOSIS — R41.3 MEMORY DEFICITS: ICD-10-CM

## 2023-03-24 PROCEDURE — 3017F COLORECTAL CA SCREEN DOC REV: CPT | Performed by: FAMILY MEDICINE

## 2023-03-24 PROCEDURE — G8427 DOCREV CUR MEDS BY ELIG CLIN: HCPCS | Performed by: FAMILY MEDICINE

## 2023-03-24 PROCEDURE — G9899 SCRN MAM PERF RSLTS DOC: HCPCS | Performed by: FAMILY MEDICINE

## 2023-03-24 PROCEDURE — G9717 DOC PT DX DEP/BP F/U NT REQ: HCPCS | Performed by: FAMILY MEDICINE

## 2023-03-24 PROCEDURE — 99213 OFFICE O/P EST LOW 20 MIN: CPT | Performed by: FAMILY MEDICINE

## 2023-03-24 PROCEDURE — G8417 CALC BMI ABV UP PARAM F/U: HCPCS | Performed by: FAMILY MEDICINE

## 2023-03-24 RX ORDER — NAPROXEN 500 MG/1
500 TABLET ORAL 2 TIMES DAILY WITH MEALS
COMMUNITY
Start: 2023-01-31

## 2023-03-24 NOTE — PROGRESS NOTES
Identified pt with two pt identifiers(name and ). Reviewed record in preparation for visit and have obtained necessary documentation. No chief complaint on file. There were no vitals filed for this visit. Health Maintenance Due   Topic    Hepatitis C Screening     Cervical cancer screen     Colorectal Cancer Screening Combo     Shingles Vaccine (1 of 2)    COVID-19 Vaccine (2 - Pfizer series)       Coordination of Care Questionnaire:  :   1) Have you been to an emergency room, urgent care, or hospitalized since your last visit? If yes, where when, and reason for visit? Yes, [facility] in [address] [place] [address] for fall. 2. Have seen or consulted any other health care provider since your last visit? If yes, where when, and reason for visit? NO      Patient is accompanied by self I have received verbal consent from Gloria Braswell to discuss any/all medical information while they are present in the room.

## 2023-03-24 NOTE — PROGRESS NOTES
Jass Gomez (: 1960) is a 57 y.o. female, established patient, here for evaluation of the following chief complaint(s):  Fall (3/19/23) and Memory Loss   Chief Complaint   Patient presents with    Fall from [facility]      56 yo F presents after fall occurring about 24 hours ago. Got up to use the bathroom and then fell, is unsure why she fell but denies syncope, cp, sob. Has been able to walk since fall. Drove herself to ER. Pt c/o right posterior hip pain and low back pain. Denies bowel/bladder incontinence or saddle anesthesia. No head injury or LOC, denies neck pain. PMH-not on blood thinners  Meds-zoloft, ambien  All-sulfa  Denies tobacco, occ etoh use      ++etoh  sees the psych, Q3m,       Constitutional: no chills and fever,  , nad     HENT: no ear pain or nosebleeds. No blurred vision  Respiratory: no shortness of breath, wheezing cough   Cardiovascular: Has no chest pain, ,and racing heart . Gastrointestinal: No constipation, diarrhea, nausea and vomiting. Genitourinary: No frequency. Musculoskeletal: Negative for joint pain. Skin: no itching, no rash. Neurological: Negative for dizziness, no tremors  Psychiatric/Behavioral: Negative for depression   is not nervous/anxious. and not depressed the patient is not nervous/anxious.         General:  alert cooperative appears stated for the age  [de-identified]; normocephalic and atraumatic PERRLA extraocular motor intact normal tympanic membrane normal external ear bilaterally, mucosal normal no drainage  Neck: supple no adenopathy no JVD no bruit  Lungs: Clear to auscultation bilaterally no rales rhonchi or wheezes  Heart: Normal regular S1-S2 ,  no murmur  Breast exam deferred  Abdomen: Soft nontender normal bowel sounds   Extremities: Normal range of motion no point tenderness normal pulses no edema  Pelvic/: No lesion, no lymphadenopathy  Skin: Normal no lesion no rash     ASSESSMENT/PLAN:  Below is the assessment and plan developed based on review of pertinent history, physical exam, labs, studies, and medications. 1. Fall, subsequent encounter  -     REFERRAL TO PHYSICAL THERAPY  2. Chronic pain of right knee  -     REFERRAL TO PHYSICAL THERAPY    No follow-ups on file. An electronic signature was used to authenticate this note.   -- Carolina Monzon MD

## 2023-03-29 PROBLEM — K52.9 CHRONIC DIARRHEA: Status: ACTIVE | Noted: 2023-03-29

## 2023-03-29 PROBLEM — N95.0 POST-MENOPAUSAL BLEEDING: Status: ACTIVE | Noted: 2023-03-29

## 2023-05-09 ENCOUNTER — OFFICE VISIT (OUTPATIENT)
Age: 63
End: 2023-05-09
Payer: MEDICARE

## 2023-05-09 VITALS
OXYGEN SATURATION: 97 % | TEMPERATURE: 98 F | DIASTOLIC BLOOD PRESSURE: 84 MMHG | HEIGHT: 64 IN | HEART RATE: 74 BPM | SYSTOLIC BLOOD PRESSURE: 126 MMHG | BODY MASS INDEX: 31.24 KG/M2 | WEIGHT: 183 LBS | RESPIRATION RATE: 16 BRPM

## 2023-05-09 DIAGNOSIS — R11.10 DRY HEAVES: ICD-10-CM

## 2023-05-09 PROCEDURE — G8427 DOCREV CUR MEDS BY ELIG CLIN: HCPCS | Performed by: STUDENT IN AN ORGANIZED HEALTH CARE EDUCATION/TRAINING PROGRAM

## 2023-05-09 PROCEDURE — 3017F COLORECTAL CA SCREEN DOC REV: CPT | Performed by: STUDENT IN AN ORGANIZED HEALTH CARE EDUCATION/TRAINING PROGRAM

## 2023-05-09 PROCEDURE — G8417 CALC BMI ABV UP PARAM F/U: HCPCS | Performed by: STUDENT IN AN ORGANIZED HEALTH CARE EDUCATION/TRAINING PROGRAM

## 2023-05-09 PROCEDURE — 4004F PT TOBACCO SCREEN RCVD TLK: CPT | Performed by: STUDENT IN AN ORGANIZED HEALTH CARE EDUCATION/TRAINING PROGRAM

## 2023-05-09 PROCEDURE — 99214 OFFICE O/P EST MOD 30 MIN: CPT | Performed by: STUDENT IN AN ORGANIZED HEALTH CARE EDUCATION/TRAINING PROGRAM

## 2023-05-09 RX ORDER — LAMOTRIGINE 100 MG/1
100 TABLET ORAL DAILY
COMMUNITY
Start: 2018-06-19

## 2023-05-09 RX ORDER — SERTRALINE HYDROCHLORIDE 100 MG/1
TABLET, FILM COATED ORAL
COMMUNITY
Start: 2018-07-16

## 2023-05-09 RX ORDER — ROPINIROLE 3 MG/1
3 TABLET, FILM COATED ORAL DAILY
COMMUNITY
Start: 2021-04-18

## 2023-05-09 RX ORDER — ZOLPIDEM TARTRATE 10 MG/1
10 TABLET ORAL NIGHTLY
COMMUNITY
Start: 2022-08-31

## 2023-05-09 RX ORDER — NAPROXEN 500 MG/1
500 TABLET ORAL 2 TIMES DAILY WITH MEALS
COMMUNITY
Start: 2023-01-31

## 2023-05-09 RX ORDER — ONDANSETRON 4 MG/1
4 TABLET, FILM COATED ORAL DAILY PRN
Qty: 20 TABLET | Refills: 0 | Status: SHIPPED | OUTPATIENT
Start: 2023-05-09

## 2023-05-09 RX ORDER — PANTOPRAZOLE SODIUM 40 MG/1
40 TABLET, DELAYED RELEASE ORAL DAILY
Qty: 90 TABLET | Refills: 0 | Status: SHIPPED | OUTPATIENT
Start: 2023-05-09

## 2023-05-09 NOTE — PROGRESS NOTES
Name and Date of Birth Verified    Pharmacy Verified    Chief Complaint   Patient presents with    ED Follow-up     3/19/2023 [facility]  Fall, initial encounter (Primary Dx); Hip hematoma, right, initial encounter;   Acute pain of right knee       1. \"Have you been to the ER, urgent care clinic since your last visit? Hospitalized since your last visit? \"     Yes  [facility]  3/19/2023  Fall    2. \"Have you seen or consulted any other health care providers outside of the [address] [place] [address] since your last visit? \" No     3. For patients aged 39-70: Has the patient had a colonoscopy / FIT/ Cologuard? N/A      If the patient is female:    4. For patients aged 41-77: Has the patient had a mammogram within the past 2 years? N/A      5. For patients aged 21-65: Has the patient had a pap smear?  No     Health Maintenance Due   Topic Date Due    HIV screen  Never done    Hepatitis C screen  Never done    Colorectal Cancer Screen  Never done    Shingles vaccine (1 of 2) Never done
thyroid normal.  LUNGS: Respirations unlabored; clear to auscultation bilaterally. CARDIOVASCULAR: Regular, rate, and rhythm without murmurs, gallops or rubs. ABDOMEN: Soft; nontender; nondistended; normoactive bowel sounds; no masses or organomegaly. MUSCULOSKELETAL: FROM in all extremities     EXT: No edema. Neurovascularlly intact. Normal gait. Neurological: Alert and oriented X 3, normal strength and tone. Normal symmetric reflexes. Normal coordination and gait       Assessment/Plan   Diagnosis Orders   1. BMI 31.0-31.9,adult  Semaglutide-Weight Management (WEGOVY) 0.25 MG/0.5ML SOAJ SC injection      2. Dry heaves  pantoprazole (PROTONIX) 40 MG tablet    ondansetron (ZOFRAN) 4 MG tablet          1. BMI 31.0-31.9,adult    - I counseled on lifestyle changes; discussed of the importance of eating habits/patterns and physical activity to overall health. Also discussed the importance to avoid smoking and excessive alcohol consumption.  - Encouraged to eat foods that are baked, broiled, boiled, steamed or grilled. Avoid greasy or fried foods. Use olive oil or canola oil instead of vegetable oil. Eat fish twice weekly. Decreasing weight by 5-10% of baseline weight over the next 6 months if overweight/obese helps to improve obesity-related conditions. Eat a low carbohydrate diet. Decrease sugary beverages, white foods and sweets. Increase exercise to 5 days weekly for 30 minutes daily minimally and as tolerated. Have at an average of 7 hours of uninterrupted sleep at night. - Semaglutide-Weight Management (WEGOVY) 0.25 MG/0.5ML SOAJ SC injection; Inject 0.25 mg into the skin every 7 days. - We discussed about GLP-1 agonists and how those are possible options, no history of family thyroid cancer or history of pancreatitis, they opted for [facility]. - recommend eating slowly, drink plenty of water, soups, avoid greasy food, consider more bland foods like rice and avoid lying down after you eat.       2. Dry

## 2023-05-19 RX ORDER — ASCORBIC ACID 500 MG
TABLET ORAL
COMMUNITY

## 2023-05-26 DIAGNOSIS — R11.10 DRY HEAVES: ICD-10-CM

## 2023-05-26 RX ORDER — PANTOPRAZOLE SODIUM 40 MG/1
TABLET, DELAYED RELEASE ORAL
Qty: 90 TABLET | Refills: 0 | OUTPATIENT
Start: 2023-05-26

## 2023-06-27 ENCOUNTER — OFFICE VISIT (OUTPATIENT)
Age: 63
End: 2023-06-27
Payer: MEDICARE

## 2023-06-27 VITALS
HEART RATE: 72 BPM | HEIGHT: 64 IN | OXYGEN SATURATION: 99 % | DIASTOLIC BLOOD PRESSURE: 85 MMHG | BODY MASS INDEX: 31.24 KG/M2 | SYSTOLIC BLOOD PRESSURE: 126 MMHG | WEIGHT: 183 LBS | TEMPERATURE: 97.5 F | RESPIRATION RATE: 16 BRPM

## 2023-06-27 DIAGNOSIS — K21.9 CHRONIC GASTROESOPHAGEAL REFLUX DISEASE: Primary | ICD-10-CM

## 2023-06-27 DIAGNOSIS — R11.10 DRY HEAVES: ICD-10-CM

## 2023-06-27 PROCEDURE — G8427 DOCREV CUR MEDS BY ELIG CLIN: HCPCS | Performed by: STUDENT IN AN ORGANIZED HEALTH CARE EDUCATION/TRAINING PROGRAM

## 2023-06-27 PROCEDURE — 3017F COLORECTAL CA SCREEN DOC REV: CPT | Performed by: STUDENT IN AN ORGANIZED HEALTH CARE EDUCATION/TRAINING PROGRAM

## 2023-06-27 PROCEDURE — G8417 CALC BMI ABV UP PARAM F/U: HCPCS | Performed by: STUDENT IN AN ORGANIZED HEALTH CARE EDUCATION/TRAINING PROGRAM

## 2023-06-27 PROCEDURE — 4004F PT TOBACCO SCREEN RCVD TLK: CPT | Performed by: STUDENT IN AN ORGANIZED HEALTH CARE EDUCATION/TRAINING PROGRAM

## 2023-06-27 PROCEDURE — 99213 OFFICE O/P EST LOW 20 MIN: CPT | Performed by: STUDENT IN AN ORGANIZED HEALTH CARE EDUCATION/TRAINING PROGRAM

## 2023-06-27 RX ORDER — PANTOPRAZOLE SODIUM 40 MG/1
40 TABLET, DELAYED RELEASE ORAL DAILY
Qty: 90 TABLET | Refills: 0
Start: 2023-06-27

## 2023-08-04 DIAGNOSIS — R11.10 DRY HEAVES: ICD-10-CM

## 2023-08-04 RX ORDER — PANTOPRAZOLE SODIUM 40 MG/1
TABLET, DELAYED RELEASE ORAL
Qty: 90 TABLET | Refills: 1 | Status: SHIPPED | OUTPATIENT
Start: 2023-08-04

## 2023-08-04 NOTE — TELEPHONE ENCOUNTER
Last appointment: 6/27/23  Next appointment: none  Previous refill encounter(s): 5/9/23 #90    Requested Prescriptions     Pending Prescriptions Disp Refills    pantoprazole (PROTONIX) 40 MG tablet [Pharmacy Med Name: PANTOPRAZOLE SOD DR 40 MG TAB] 90 tablet 1     Sig: TAKE 1 TABLET BY MOUTH EVERY DAY         For Pharmacy Admin Tracking Only    Program: Medication Refill  CPA in place:    Recommendation Provided To:    Intervention Detail: New Rx: 1, reason: Patient Preference  Intervention Accepted By:   Sandie Luis Closed?:    Time Spent (min): 5

## 2023-08-07 ENCOUNTER — TELEPHONE (OUTPATIENT)
Age: 63
End: 2023-08-07

## 2023-09-26 ENCOUNTER — TELEPHONE (OUTPATIENT)
Age: 63
End: 2023-09-26

## 2023-10-13 ENCOUNTER — HOSPITAL ENCOUNTER (OUTPATIENT)
Facility: HOSPITAL | Age: 63
End: 2023-10-13
Payer: MEDICARE

## 2023-10-13 DIAGNOSIS — M17.11 ARTHRITIS OF RIGHT KNEE: ICD-10-CM

## 2023-10-13 PROCEDURE — 73700 CT LOWER EXTREMITY W/O DYE: CPT

## 2023-11-02 ENCOUNTER — OFFICE VISIT (OUTPATIENT)
Age: 63
End: 2023-11-02
Payer: MEDICARE

## 2023-11-02 VITALS
SYSTOLIC BLOOD PRESSURE: 133 MMHG | WEIGHT: 190 LBS | RESPIRATION RATE: 18 BRPM | TEMPERATURE: 97.6 F | DIASTOLIC BLOOD PRESSURE: 88 MMHG | BODY MASS INDEX: 32.61 KG/M2 | OXYGEN SATURATION: 96 % | HEART RATE: 69 BPM

## 2023-11-02 DIAGNOSIS — Z01.818 PREOPERATIVE EXAMINATION: Primary | ICD-10-CM

## 2023-11-02 PROCEDURE — 1090F PRES/ABSN URINE INCON ASSESS: CPT | Performed by: FAMILY MEDICINE

## 2023-11-02 PROCEDURE — 99213 OFFICE O/P EST LOW 20 MIN: CPT | Performed by: FAMILY MEDICINE

## 2023-11-02 PROCEDURE — 3017F COLORECTAL CA SCREEN DOC REV: CPT | Performed by: FAMILY MEDICINE

## 2023-11-02 PROCEDURE — G8400 PT W/DXA NO RESULTS DOC: HCPCS | Performed by: FAMILY MEDICINE

## 2023-11-02 PROCEDURE — 1123F ACP DISCUSS/DSCN MKR DOCD: CPT | Performed by: FAMILY MEDICINE

## 2023-11-02 PROCEDURE — G8484 FLU IMMUNIZE NO ADMIN: HCPCS | Performed by: FAMILY MEDICINE

## 2023-11-02 PROCEDURE — 1036F TOBACCO NON-USER: CPT | Performed by: FAMILY MEDICINE

## 2023-11-02 PROCEDURE — G8427 DOCREV CUR MEDS BY ELIG CLIN: HCPCS | Performed by: FAMILY MEDICINE

## 2023-11-02 PROCEDURE — G8417 CALC BMI ABV UP PARAM F/U: HCPCS | Performed by: FAMILY MEDICINE

## 2023-11-02 RX ORDER — CELECOXIB 100 MG/1
100 CAPSULE ORAL 2 TIMES DAILY
COMMUNITY
Start: 2023-05-15

## 2023-11-02 RX ORDER — CELECOXIB 100 MG/1
100 CAPSULE ORAL 2 TIMES DAILY
COMMUNITY
Start: 2023-10-10

## 2023-11-02 ASSESSMENT — PATIENT HEALTH QUESTIONNAIRE - PHQ9
SUM OF ALL RESPONSES TO PHQ QUESTIONS 1-9: 0
SUM OF ALL RESPONSES TO PHQ9 QUESTIONS 1 & 2: 0
SUM OF ALL RESPONSES TO PHQ QUESTIONS 1-9: 0
8. MOVING OR SPEAKING SO SLOWLY THAT OTHER PEOPLE COULD HAVE NOTICED. OR THE OPPOSITE, BEING SO FIGETY OR RESTLESS THAT YOU HAVE BEEN MOVING AROUND A LOT MORE THAN USUAL: 0
SUM OF ALL RESPONSES TO PHQ QUESTIONS 1-9: 0
4. FEELING TIRED OR HAVING LITTLE ENERGY: 0
3. TROUBLE FALLING OR STAYING ASLEEP: 0
2. FEELING DOWN, DEPRESSED OR HOPELESS: 0
SUM OF ALL RESPONSES TO PHQ QUESTIONS 1-9: 0
1. LITTLE INTEREST OR PLEASURE IN DOING THINGS: 0
6. FEELING BAD ABOUT YOURSELF - OR THAT YOU ARE A FAILURE OR HAVE LET YOURSELF OR YOUR FAMILY DOWN: 0
SUM OF ALL RESPONSES TO PHQ QUESTIONS 1-9: 0
5. POOR APPETITE OR OVEREATING: 0
SUM OF ALL RESPONSES TO PHQ QUESTIONS 1-9: 0
2. FEELING DOWN, DEPRESSED OR HOPELESS: 0
SUM OF ALL RESPONSES TO PHQ9 QUESTIONS 1 & 2: 0
SUM OF ALL RESPONSES TO PHQ QUESTIONS 1-9: 0
1. LITTLE INTEREST OR PLEASURE IN DOING THINGS: 0
9. THOUGHTS THAT YOU WOULD BE BETTER OFF DEAD, OR OF HURTING YOURSELF: 0
7. TROUBLE CONCENTRATING ON THINGS, SUCH AS READING THE NEWSPAPER OR WATCHING TELEVISION: 0
SUM OF ALL RESPONSES TO PHQ QUESTIONS 1-9: 0

## 2023-11-07 ENCOUNTER — HOSPITAL ENCOUNTER (OUTPATIENT)
Facility: HOSPITAL | Age: 63
Discharge: HOME | End: 2023-11-10
Payer: MEDICARE

## 2023-11-07 VITALS
BODY MASS INDEX: 33.28 KG/M2 | WEIGHT: 187.83 LBS | SYSTOLIC BLOOD PRESSURE: 146 MMHG | RESPIRATION RATE: 16 BRPM | TEMPERATURE: 98.6 F | HEART RATE: 73 BPM | OXYGEN SATURATION: 100 % | HEIGHT: 63 IN | DIASTOLIC BLOOD PRESSURE: 75 MMHG

## 2023-11-07 LAB
ABO + RH BLD: NORMAL
ALBUMIN SERPL-MCNC: 3.8 G/DL (ref 3.5–5)
ALBUMIN/GLOB SERPL: 1.2 (ref 1.1–2.2)
ALP SERPL-CCNC: 136 U/L (ref 45–117)
ALT SERPL-CCNC: 52 U/L (ref 12–78)
ANION GAP SERPL CALC-SCNC: 8 MMOL/L (ref 5–15)
APPEARANCE UR: ABNORMAL
AST SERPL-CCNC: 25 U/L (ref 15–37)
BACTERIA URNS QL MICRO: ABNORMAL /HPF
BILIRUB SERPL-MCNC: 0.6 MG/DL (ref 0.2–1)
BILIRUB UR QL: NEGATIVE
BLOOD GROUP ANTIBODIES SERPL: NORMAL
BUN SERPL-MCNC: 17 MG/DL (ref 6–20)
BUN/CREAT SERPL: 24 (ref 12–20)
CALCIUM SERPL-MCNC: 9.1 MG/DL (ref 8.5–10.1)
CHLORIDE SERPL-SCNC: 110 MMOL/L (ref 97–108)
CO2 SERPL-SCNC: 24 MMOL/L (ref 21–32)
COLOR UR: ABNORMAL
CREAT SERPL-MCNC: 0.71 MG/DL (ref 0.55–1.02)
EKG ATRIAL RATE: 75 BPM
EKG DIAGNOSIS: NORMAL
EKG P AXIS: 16 DEGREES
EKG P-R INTERVAL: 168 MS
EKG Q-T INTERVAL: 374 MS
EKG QRS DURATION: 74 MS
EKG QTC CALCULATION (BAZETT): 417 MS
EKG R AXIS: -6 DEGREES
EKG T AXIS: 31 DEGREES
EKG VENTRICULAR RATE: 75 BPM
EPITH CASTS URNS QL MICRO: ABNORMAL /LPF
ERYTHROCYTE [DISTWIDTH] IN BLOOD BY AUTOMATED COUNT: 13 % (ref 11.5–14.5)
EST. AVERAGE GLUCOSE BLD GHB EST-MCNC: 82 MG/DL
GLOBULIN SER CALC-MCNC: 3.3 G/DL (ref 2–4)
GLUCOSE SERPL-MCNC: 101 MG/DL (ref 65–100)
GLUCOSE UR STRIP.AUTO-MCNC: NEGATIVE MG/DL
HBA1C MFR BLD: 4.5 % (ref 4–5.6)
HCT VFR BLD AUTO: 42.9 % (ref 35–47)
HGB BLD-MCNC: 13.4 G/DL (ref 11.5–16)
HGB UR QL STRIP: NEGATIVE
INR PPP: 1 (ref 0.9–1.1)
KETONES UR QL STRIP.AUTO: NEGATIVE MG/DL
LEUKOCYTE ESTERASE UR QL STRIP.AUTO: ABNORMAL
MCH RBC QN AUTO: 30.1 PG (ref 26–34)
MCHC RBC AUTO-ENTMCNC: 31.2 G/DL (ref 30–36.5)
MCV RBC AUTO: 96.4 FL (ref 80–99)
NITRITE UR QL STRIP.AUTO: NEGATIVE
NRBC # BLD: 0 K/UL (ref 0–0.01)
NRBC BLD-RTO: 0 PER 100 WBC
PH UR STRIP: 5 (ref 5–8)
PLATELET # BLD AUTO: 377 K/UL (ref 150–400)
PMV BLD AUTO: 10.1 FL (ref 8.9–12.9)
POTASSIUM SERPL-SCNC: 3.8 MMOL/L (ref 3.5–5.1)
PROT SERPL-MCNC: 7.1 G/DL (ref 6.4–8.2)
PROT UR STRIP-MCNC: NEGATIVE MG/DL
PROTHROMBIN TIME: 10.2 SEC (ref 9–11.1)
RBC # BLD AUTO: 4.45 M/UL (ref 3.8–5.2)
RBC #/AREA URNS HPF: ABNORMAL /HPF (ref 0–5)
SODIUM SERPL-SCNC: 142 MMOL/L (ref 136–145)
SP GR UR REFRACTOMETRY: 1.02
SPECIMEN EXP DATE BLD: NORMAL
URINE CULTURE IF INDICATED: ABNORMAL
UROBILINOGEN UR QL STRIP.AUTO: 0.2 EU/DL (ref 0.2–1)
WBC # BLD AUTO: 9.3 K/UL (ref 3.6–11)
WBC URNS QL MICRO: ABNORMAL /HPF (ref 0–4)

## 2023-11-07 PROCEDURE — 83036 HEMOGLOBIN GLYCOSYLATED A1C: CPT

## 2023-11-07 PROCEDURE — 86901 BLOOD TYPING SEROLOGIC RH(D): CPT

## 2023-11-07 PROCEDURE — 97161 PT EVAL LOW COMPLEX 20 MIN: CPT

## 2023-11-07 PROCEDURE — 86900 BLOOD TYPING SEROLOGIC ABO: CPT

## 2023-11-07 PROCEDURE — APPNB30 APP NON BILLABLE TIME 0-30 MINS: Performed by: NURSE PRACTITIONER

## 2023-11-07 PROCEDURE — 97530 THERAPEUTIC ACTIVITIES: CPT

## 2023-11-07 PROCEDURE — 80053 COMPREHEN METABOLIC PANEL: CPT

## 2023-11-07 PROCEDURE — 86850 RBC ANTIBODY SCREEN: CPT

## 2023-11-07 PROCEDURE — 36415 COLL VENOUS BLD VENIPUNCTURE: CPT

## 2023-11-07 PROCEDURE — 85610 PROTHROMBIN TIME: CPT

## 2023-11-07 PROCEDURE — 87086 URINE CULTURE/COLONY COUNT: CPT

## 2023-11-07 PROCEDURE — 81001 URINALYSIS AUTO W/SCOPE: CPT

## 2023-11-07 PROCEDURE — 85027 COMPLETE CBC AUTOMATED: CPT

## 2023-11-07 PROCEDURE — 87077 CULTURE AEROBIC IDENTIFY: CPT

## 2023-11-07 PROCEDURE — 87186 SC STD MICRODIL/AGAR DIL: CPT

## 2023-11-07 RX ORDER — LANOLIN ALCOHOL/MO/W.PET/CERES
1000 CREAM (GRAM) TOPICAL DAILY
COMMUNITY

## 2023-11-07 RX ORDER — ACETAMINOPHEN 500 MG
1000 TABLET ORAL ONCE
OUTPATIENT
Start: 2023-11-15

## 2023-11-07 RX ORDER — SODIUM CHLORIDE, SODIUM LACTATE, POTASSIUM CHLORIDE, CALCIUM CHLORIDE 600; 310; 30; 20 MG/100ML; MG/100ML; MG/100ML; MG/100ML
INJECTION, SOLUTION INTRAVENOUS CONTINUOUS
OUTPATIENT
Start: 2023-11-15

## 2023-11-07 RX ORDER — CELECOXIB 200 MG/1
200 CAPSULE ORAL ONCE
OUTPATIENT
Start: 2023-11-15

## 2023-11-07 ASSESSMENT — PAIN SCALES - GENERAL: PAINLEVEL_OUTOF10: 0

## 2023-11-08 LAB
BACTERIA SPEC CULT: NORMAL
BACTERIA SPEC CULT: NORMAL
SERVICE CMNT-IMP: NORMAL

## 2023-11-09 LAB
BACTERIA SPEC CULT: ABNORMAL
CC UR VC: ABNORMAL
SERVICE CMNT-IMP: ABNORMAL

## 2023-11-09 RX ORDER — CEFDINIR 300 MG/1
300 CAPSULE ORAL 2 TIMES DAILY
Qty: 14 CAPSULE | Refills: 0 | Status: SHIPPED | OUTPATIENT
Start: 2023-11-09 | End: 2023-11-16

## 2023-11-14 ENCOUNTER — ANESTHESIA EVENT (OUTPATIENT)
Facility: HOSPITAL | Age: 63
End: 2023-11-14
Payer: MEDICARE

## 2023-11-14 NOTE — ANESTHESIA PRE PROCEDURE
11/07/2023 08:02 AM    ALT 52 11/07/2023 08:02 AM       POC Tests: No results for input(s): \"POCGLU\", \"POCNA\", \"POCK\", \"POCCL\", \"POCBUN\", \"POCHEMO\", \"POCHCT\" in the last 72 hours. Coags:   Lab Results   Component Value Date/Time    PROTIME 10.2 11/07/2023 08:02 AM    INR 1.0 11/07/2023 08:02 AM       HCG (If Applicable): No results found for: \"PREGTESTUR\", \"PREGSERUM\", \"HCG\", \"HCGQUANT\"     ABGs: No results found for: \"PHART\", \"PO2ART\", \"RUS9XST\", \"AOC1DBZ\", \"BEART\", \"T5WQLRUL\"     Type & Screen (If Applicable):  No results found for: \"LABABO\", \"LABRH\"    Drug/Infectious Status (If Applicable):  No results found for: \"HIV\", \"HEPCAB\"    COVID-19 Screening (If Applicable): No results found for: \"COVID19\"        Anesthesia Evaluation  Patient summary reviewed and Nursing notes reviewed   history of anesthetic complications: PONV. Airway: Mallampati: II  TM distance: >3 FB   Neck ROM: full  Mouth opening: > = 3 FB   Dental: normal exam         Pulmonary:normal exam  breath sounds clear to auscultation                             Cardiovascular:Negative CV ROS            Rhythm: regular  Rate: normal                    Neuro/Psych:   (+) seizures: well controlled, psychiatric history:depression/anxiety             GI/Hepatic/Renal: Neg GI/Hepatic/Renal ROS            Endo/Other:                      ROS comment: S/p gastric bypass Abdominal: normal exam            Vascular: Other Findings:           Anesthesia Plan      MAC and spinal     ASA 2       Induction: intravenous. MIPS: Postoperative opioids intended and Prophylactic antiemetics administered. Anesthetic plan and risks discussed with patient. Plan discussed with CRNA.                     Chaz Landry MD   11/14/2023

## 2023-11-15 ENCOUNTER — APPOINTMENT (OUTPATIENT)
Facility: HOSPITAL | Age: 63
End: 2023-11-15
Attending: ORTHOPAEDIC SURGERY
Payer: MEDICARE

## 2023-11-15 ENCOUNTER — ANESTHESIA (OUTPATIENT)
Facility: HOSPITAL | Age: 63
End: 2023-11-15
Payer: MEDICARE

## 2023-11-15 ENCOUNTER — HOSPITAL ENCOUNTER (OUTPATIENT)
Facility: HOSPITAL | Age: 63
Setting detail: OBSERVATION
Discharge: HOME HEALTH | End: 2023-11-16
Attending: ORTHOPAEDIC SURGERY | Admitting: ORTHOPAEDIC SURGERY
Payer: MEDICARE

## 2023-11-15 DIAGNOSIS — Z96.651 STATUS POST TOTAL RIGHT KNEE REPLACEMENT: Primary | ICD-10-CM

## 2023-11-15 DIAGNOSIS — G25.81 RLS (RESTLESS LEGS SYNDROME): ICD-10-CM

## 2023-11-15 PROBLEM — M17.11 PRIMARY OSTEOARTHRITIS OF RIGHT KNEE: Status: ACTIVE | Noted: 2023-11-15

## 2023-11-15 PROCEDURE — 2500000003 HC RX 250 WO HCPCS: Performed by: ORTHOPAEDIC SURGERY

## 2023-11-15 PROCEDURE — 97162 PT EVAL MOD COMPLEX 30 MIN: CPT

## 2023-11-15 PROCEDURE — 6360000002 HC RX W HCPCS: Performed by: STUDENT IN AN ORGANIZED HEALTH CARE EDUCATION/TRAINING PROGRAM

## 2023-11-15 PROCEDURE — 6360000002 HC RX W HCPCS: Performed by: ORTHOPAEDIC SURGERY

## 2023-11-15 PROCEDURE — 3600000015 HC SURGERY LEVEL 5 ADDTL 15MIN: Performed by: ORTHOPAEDIC SURGERY

## 2023-11-15 PROCEDURE — 3700000001 HC ADD 15 MINUTES (ANESTHESIA): Performed by: ORTHOPAEDIC SURGERY

## 2023-11-15 PROCEDURE — 2580000003 HC RX 258: Performed by: ORTHOPAEDIC SURGERY

## 2023-11-15 PROCEDURE — 6370000000 HC RX 637 (ALT 250 FOR IP): Performed by: ORTHOPAEDIC SURGERY

## 2023-11-15 PROCEDURE — 2580000003 HC RX 258: Performed by: STUDENT IN AN ORGANIZED HEALTH CARE EDUCATION/TRAINING PROGRAM

## 2023-11-15 PROCEDURE — 2709999900 HC NON-CHARGEABLE SUPPLY: Performed by: ORTHOPAEDIC SURGERY

## 2023-11-15 PROCEDURE — G0378 HOSPITAL OBSERVATION PER HR: HCPCS

## 2023-11-15 PROCEDURE — 73560 X-RAY EXAM OF KNEE 1 OR 2: CPT

## 2023-11-15 PROCEDURE — 3700000000 HC ANESTHESIA ATTENDED CARE: Performed by: ORTHOPAEDIC SURGERY

## 2023-11-15 PROCEDURE — 2720000010 HC SURG SUPPLY STERILE: Performed by: ORTHOPAEDIC SURGERY

## 2023-11-15 PROCEDURE — 3600000005 HC SURGERY LEVEL 5 BASE: Performed by: ORTHOPAEDIC SURGERY

## 2023-11-15 PROCEDURE — 7100000000 HC PACU RECOVERY - FIRST 15 MIN: Performed by: ORTHOPAEDIC SURGERY

## 2023-11-15 PROCEDURE — 2500000003 HC RX 250 WO HCPCS: Performed by: NURSE ANESTHETIST, CERTIFIED REGISTERED

## 2023-11-15 PROCEDURE — C1776 JOINT DEVICE (IMPLANTABLE): HCPCS | Performed by: ORTHOPAEDIC SURGERY

## 2023-11-15 PROCEDURE — 97116 GAIT TRAINING THERAPY: CPT

## 2023-11-15 PROCEDURE — 64447 NJX AA&/STRD FEMORAL NRV IMG: CPT | Performed by: STUDENT IN AN ORGANIZED HEALTH CARE EDUCATION/TRAINING PROGRAM

## 2023-11-15 PROCEDURE — 6370000000 HC RX 637 (ALT 250 FOR IP): Performed by: STUDENT IN AN ORGANIZED HEALTH CARE EDUCATION/TRAINING PROGRAM

## 2023-11-15 PROCEDURE — 6360000002 HC RX W HCPCS: Performed by: NURSE ANESTHETIST, CERTIFIED REGISTERED

## 2023-11-15 PROCEDURE — APPNB60 APP NON BILLABLE TIME 46-60 MINS: Performed by: NURSE PRACTITIONER

## 2023-11-15 PROCEDURE — 7100000001 HC PACU RECOVERY - ADDTL 15 MIN: Performed by: ORTHOPAEDIC SURGERY

## 2023-11-15 DEVICE — PATELLA
Type: IMPLANTABLE DEVICE | Site: KNEE | Status: FUNCTIONAL
Brand: TRIATHLON

## 2023-11-15 DEVICE — COMPONENT TOT KNEE CAPPED PRIMARY K2STRYKER] STRYKER CORP]: Type: IMPLANTABLE DEVICE | Status: FUNCTIONAL

## 2023-11-15 DEVICE — CRUCIATE RETAINING FEMORAL
Type: IMPLANTABLE DEVICE | Site: KNEE | Status: FUNCTIONAL
Brand: TRIATHLON

## 2023-11-15 DEVICE — TIBIAL COMPONENT
Type: IMPLANTABLE DEVICE | Site: KNEE | Status: FUNCTIONAL
Brand: TRIATHLON

## 2023-11-15 RX ORDER — DEXAMETHASONE SODIUM PHOSPHATE 4 MG/ML
10 INJECTION, SOLUTION INTRA-ARTICULAR; INTRALESIONAL; INTRAMUSCULAR; INTRAVENOUS; SOFT TISSUE ONCE
Status: COMPLETED | OUTPATIENT
Start: 2023-11-16 | End: 2023-11-16

## 2023-11-15 RX ORDER — PROPOFOL 10 MG/ML
INJECTION, EMULSION INTRAVENOUS CONTINUOUS PRN
Status: DISCONTINUED | OUTPATIENT
Start: 2023-11-15 | End: 2023-11-15 | Stop reason: SDUPTHER

## 2023-11-15 RX ORDER — OXYCODONE HYDROCHLORIDE 5 MG/1
5 TABLET ORAL EVERY 4 HOURS PRN
Status: DISCONTINUED | OUTPATIENT
Start: 2023-11-15 | End: 2023-11-16 | Stop reason: HOSPADM

## 2023-11-15 RX ORDER — POLYETHYLENE GLYCOL 3350 17 G/17G
17 POWDER, FOR SOLUTION ORAL DAILY
Status: DISCONTINUED | OUTPATIENT
Start: 2023-11-15 | End: 2023-11-16 | Stop reason: HOSPADM

## 2023-11-15 RX ORDER — DIPHENHYDRAMINE HYDROCHLORIDE 50 MG/ML
INJECTION INTRAMUSCULAR; INTRAVENOUS PRN
Status: DISCONTINUED | OUTPATIENT
Start: 2023-11-15 | End: 2023-11-15 | Stop reason: SDUPTHER

## 2023-11-15 RX ORDER — ACETAMINOPHEN 500 MG
1000 TABLET ORAL EVERY 8 HOURS SCHEDULED
Qty: 120 TABLET | Refills: 3 | Status: SHIPPED | OUTPATIENT
Start: 2023-11-15

## 2023-11-15 RX ORDER — SODIUM CHLORIDE 0.9 % (FLUSH) 0.9 %
5-40 SYRINGE (ML) INJECTION PRN
Status: DISCONTINUED | OUTPATIENT
Start: 2023-11-15 | End: 2023-11-15 | Stop reason: HOSPADM

## 2023-11-15 RX ORDER — LAMOTRIGINE 100 MG/1
100 TABLET ORAL NIGHTLY
Status: DISCONTINUED | OUTPATIENT
Start: 2023-11-15 | End: 2023-11-16 | Stop reason: HOSPADM

## 2023-11-15 RX ORDER — HYDROMORPHONE HYDROCHLORIDE 1 MG/ML
0.5 INJECTION, SOLUTION INTRAMUSCULAR; INTRAVENOUS; SUBCUTANEOUS EVERY 5 MIN PRN
Status: DISCONTINUED | OUTPATIENT
Start: 2023-11-15 | End: 2023-11-15 | Stop reason: HOSPADM

## 2023-11-15 RX ORDER — BISACODYL 10 MG
10 SUPPOSITORY, RECTAL RECTAL DAILY PRN
Status: DISCONTINUED | OUTPATIENT
Start: 2023-11-15 | End: 2023-11-16 | Stop reason: HOSPADM

## 2023-11-15 RX ORDER — POLYETHYLENE GLYCOL 3350 17 G/17G
17 POWDER, FOR SOLUTION ORAL DAILY
Qty: 7 PACKET | Refills: 0 | Status: SHIPPED | OUTPATIENT
Start: 2023-11-16 | End: 2023-11-23

## 2023-11-15 RX ORDER — SODIUM CHLORIDE 0.9 % (FLUSH) 0.9 %
5-40 SYRINGE (ML) INJECTION EVERY 12 HOURS SCHEDULED
Status: DISCONTINUED | OUTPATIENT
Start: 2023-11-15 | End: 2023-11-16 | Stop reason: HOSPADM

## 2023-11-15 RX ORDER — OXYCODONE HYDROCHLORIDE 5 MG/1
5 TABLET ORAL
Status: COMPLETED | OUTPATIENT
Start: 2023-11-15 | End: 2023-11-15

## 2023-11-15 RX ORDER — SENNA AND DOCUSATE SODIUM 50; 8.6 MG/1; MG/1
1 TABLET, FILM COATED ORAL 2 TIMES DAILY
Qty: 14 TABLET | Refills: 0 | Status: SHIPPED | OUTPATIENT
Start: 2023-11-15 | End: 2023-11-22

## 2023-11-15 RX ORDER — MIDAZOLAM HYDROCHLORIDE 1 MG/ML
INJECTION INTRAMUSCULAR; INTRAVENOUS
Status: COMPLETED | OUTPATIENT
Start: 2023-11-15 | End: 2023-11-15

## 2023-11-15 RX ORDER — ROPIVACAINE HYDROCHLORIDE 5 MG/ML
INJECTION, SOLUTION EPIDURAL; INFILTRATION; PERINEURAL PRN
Status: DISCONTINUED | OUTPATIENT
Start: 2023-11-15 | End: 2023-11-15 | Stop reason: ALTCHOICE

## 2023-11-15 RX ORDER — ZOLPIDEM TARTRATE 10 MG/1
10 TABLET ORAL NIGHTLY
Qty: 7 TABLET | Refills: 0 | Status: SHIPPED
Start: 2023-11-15 | End: 2023-11-22

## 2023-11-15 RX ORDER — ROPINIROLE 1 MG/1
3 TABLET, FILM COATED ORAL NIGHTLY
Status: DISCONTINUED | OUTPATIENT
Start: 2023-11-15 | End: 2023-11-16 | Stop reason: HOSPADM

## 2023-11-15 RX ORDER — PHENYLEPHRINE HCL IN 0.9% NACL 0.4MG/10ML
SYRINGE (ML) INTRAVENOUS PRN
Status: DISCONTINUED | OUTPATIENT
Start: 2023-11-15 | End: 2023-11-15 | Stop reason: SDUPTHER

## 2023-11-15 RX ORDER — ACETAMINOPHEN 500 MG
1000 TABLET ORAL ONCE
Status: COMPLETED | OUTPATIENT
Start: 2023-11-15 | End: 2023-11-15

## 2023-11-15 RX ORDER — 0.9 % SODIUM CHLORIDE 0.9 %
500 INTRAVENOUS SOLUTION INTRAVENOUS ONCE AS NEEDED
Status: DISCONTINUED | OUTPATIENT
Start: 2023-11-15 | End: 2023-11-16 | Stop reason: HOSPADM

## 2023-11-15 RX ORDER — OXYCODONE HYDROCHLORIDE 5 MG/1
10 TABLET ORAL EVERY 4 HOURS PRN
Status: DISCONTINUED | OUTPATIENT
Start: 2023-11-15 | End: 2023-11-16 | Stop reason: HOSPADM

## 2023-11-15 RX ORDER — SODIUM CHLORIDE 9 MG/ML
INJECTION, SOLUTION INTRAVENOUS CONTINUOUS
Status: DISCONTINUED | OUTPATIENT
Start: 2023-11-15 | End: 2023-11-16 | Stop reason: HOSPADM

## 2023-11-15 RX ORDER — MORPHINE SULFATE 2 MG/ML
2 INJECTION, SOLUTION INTRAMUSCULAR; INTRAVENOUS
Status: DISPENSED | OUTPATIENT
Start: 2023-11-15 | End: 2023-11-16

## 2023-11-15 RX ORDER — FAMOTIDINE 20 MG/1
20 TABLET, FILM COATED ORAL 2 TIMES DAILY
Status: DISCONTINUED | OUTPATIENT
Start: 2023-11-15 | End: 2023-11-16 | Stop reason: HOSPADM

## 2023-11-15 RX ORDER — ONDANSETRON 2 MG/ML
4 INJECTION INTRAMUSCULAR; INTRAVENOUS
Status: DISCONTINUED | OUTPATIENT
Start: 2023-11-15 | End: 2023-11-15 | Stop reason: HOSPADM

## 2023-11-15 RX ORDER — DIPHENHYDRAMINE HCL 25 MG
25 CAPSULE ORAL EVERY 6 HOURS PRN
Status: DISCONTINUED | OUTPATIENT
Start: 2023-11-15 | End: 2023-11-16 | Stop reason: HOSPADM

## 2023-11-15 RX ORDER — SENNA AND DOCUSATE SODIUM 50; 8.6 MG/1; MG/1
1 TABLET, FILM COATED ORAL 2 TIMES DAILY
Status: DISCONTINUED | OUTPATIENT
Start: 2023-11-15 | End: 2023-11-16 | Stop reason: HOSPADM

## 2023-11-15 RX ORDER — ASPIRIN 81 MG/1
81 TABLET ORAL 2 TIMES DAILY
Status: DISCONTINUED | OUTPATIENT
Start: 2023-11-15 | End: 2023-11-16 | Stop reason: HOSPADM

## 2023-11-15 RX ORDER — ONDANSETRON 4 MG/1
4 TABLET, ORALLY DISINTEGRATING ORAL EVERY 8 HOURS PRN
Status: DISCONTINUED | OUTPATIENT
Start: 2023-11-15 | End: 2023-11-16 | Stop reason: HOSPADM

## 2023-11-15 RX ORDER — DEXAMETHASONE SODIUM PHOSPHATE 4 MG/ML
INJECTION, SOLUTION INTRA-ARTICULAR; INTRALESIONAL; INTRAMUSCULAR; INTRAVENOUS; SOFT TISSUE PRN
Status: DISCONTINUED | OUTPATIENT
Start: 2023-11-15 | End: 2023-11-15 | Stop reason: SDUPTHER

## 2023-11-15 RX ORDER — CELECOXIB 200 MG/1
200 CAPSULE ORAL ONCE
Status: COMPLETED | OUTPATIENT
Start: 2023-11-15 | End: 2023-11-15

## 2023-11-15 RX ORDER — SODIUM CHLORIDE 0.9 % (FLUSH) 0.9 %
5-40 SYRINGE (ML) INJECTION PRN
Status: DISCONTINUED | OUTPATIENT
Start: 2023-11-15 | End: 2023-11-16 | Stop reason: HOSPADM

## 2023-11-15 RX ORDER — DIPHENHYDRAMINE HYDROCHLORIDE 50 MG/ML
25 INJECTION INTRAMUSCULAR; INTRAVENOUS EVERY 6 HOURS PRN
Status: DISCONTINUED | OUTPATIENT
Start: 2023-11-15 | End: 2023-11-16 | Stop reason: HOSPADM

## 2023-11-15 RX ORDER — HYDRALAZINE HYDROCHLORIDE 20 MG/ML
10 INJECTION INTRAMUSCULAR; INTRAVENOUS EVERY 6 HOURS PRN
Status: DISCONTINUED | OUTPATIENT
Start: 2023-11-15 | End: 2023-11-16 | Stop reason: HOSPADM

## 2023-11-15 RX ORDER — TRANEXAMIC ACID 650 MG/1
1950 TABLET ORAL
Status: DISCONTINUED | OUTPATIENT
Start: 2023-11-16 | End: 2023-11-16 | Stop reason: HOSPADM

## 2023-11-15 RX ORDER — SODIUM CHLORIDE 0.9 % (FLUSH) 0.9 %
5-40 SYRINGE (ML) INJECTION EVERY 12 HOURS SCHEDULED
Status: DISCONTINUED | OUTPATIENT
Start: 2023-11-15 | End: 2023-11-15 | Stop reason: HOSPADM

## 2023-11-15 RX ORDER — TRANEXAMIC ACID 100 MG/ML
INJECTION, SOLUTION INTRAVENOUS PRN
Status: DISCONTINUED | OUTPATIENT
Start: 2023-11-15 | End: 2023-11-15 | Stop reason: SDUPTHER

## 2023-11-15 RX ORDER — SODIUM CHLORIDE, SODIUM LACTATE, POTASSIUM CHLORIDE, CALCIUM CHLORIDE 600; 310; 30; 20 MG/100ML; MG/100ML; MG/100ML; MG/100ML
INJECTION, SOLUTION INTRAVENOUS CONTINUOUS
Status: DISCONTINUED | OUTPATIENT
Start: 2023-11-15 | End: 2023-11-15 | Stop reason: HOSPADM

## 2023-11-15 RX ORDER — ONDANSETRON 2 MG/ML
INJECTION INTRAMUSCULAR; INTRAVENOUS PRN
Status: DISCONTINUED | OUTPATIENT
Start: 2023-11-15 | End: 2023-11-15 | Stop reason: SDUPTHER

## 2023-11-15 RX ORDER — TRANEXAMIC ACID 650 MG/1
1950 TABLET ORAL
Qty: 9 TABLET | Refills: 0 | Status: SHIPPED | OUTPATIENT
Start: 2023-11-16 | End: 2023-11-16 | Stop reason: SDUPTHER

## 2023-11-15 RX ORDER — FENTANYL CITRATE 50 UG/ML
25 INJECTION, SOLUTION INTRAMUSCULAR; INTRAVENOUS EVERY 5 MIN PRN
Status: DISCONTINUED | OUTPATIENT
Start: 2023-11-15 | End: 2023-11-15 | Stop reason: HOSPADM

## 2023-11-15 RX ORDER — OXYCODONE HYDROCHLORIDE 5 MG/1
5-10 TABLET ORAL EVERY 4 HOURS PRN
Qty: 40 TABLET | Refills: 0 | Status: SHIPPED | OUTPATIENT
Start: 2023-11-15 | End: 2023-11-16 | Stop reason: SDUPTHER

## 2023-11-15 RX ORDER — ONDANSETRON 2 MG/ML
4 INJECTION INTRAMUSCULAR; INTRAVENOUS EVERY 6 HOURS PRN
Status: DISCONTINUED | OUTPATIENT
Start: 2023-11-15 | End: 2023-11-16 | Stop reason: HOSPADM

## 2023-11-15 RX ORDER — KETOROLAC TROMETHAMINE 30 MG/ML
15 INJECTION, SOLUTION INTRAMUSCULAR; INTRAVENOUS EVERY 6 HOURS
Status: COMPLETED | OUTPATIENT
Start: 2023-11-15 | End: 2023-11-16

## 2023-11-15 RX ORDER — ACETAMINOPHEN 500 MG
1000 TABLET ORAL EVERY 8 HOURS SCHEDULED
Status: DISCONTINUED | OUTPATIENT
Start: 2023-11-15 | End: 2023-11-16 | Stop reason: HOSPADM

## 2023-11-15 RX ORDER — ROPIVACAINE HYDROCHLORIDE 5 MG/ML
INJECTION, SOLUTION EPIDURAL; INFILTRATION; PERINEURAL
Status: COMPLETED | OUTPATIENT
Start: 2023-11-15 | End: 2023-11-15

## 2023-11-15 RX ORDER — CELECOXIB 100 MG/1
100 CAPSULE ORAL 2 TIMES DAILY
Status: DISCONTINUED | OUTPATIENT
Start: 2023-11-16 | End: 2023-11-16 | Stop reason: HOSPADM

## 2023-11-15 RX ORDER — TRANEXAMIC ACID 100 MG/ML
INJECTION, SOLUTION INTRAVENOUS PRN
Status: DISCONTINUED | OUTPATIENT
Start: 2023-11-15 | End: 2023-11-15 | Stop reason: ALTCHOICE

## 2023-11-15 RX ORDER — ZOLPIDEM TARTRATE 5 MG/1
10 TABLET ORAL NIGHTLY PRN
Status: DISCONTINUED | OUTPATIENT
Start: 2023-11-15 | End: 2023-11-16 | Stop reason: HOSPADM

## 2023-11-15 RX ORDER — ASPIRIN 81 MG/1
81 TABLET ORAL 2 TIMES DAILY
Qty: 60 TABLET | Refills: 0 | Status: SHIPPED | OUTPATIENT
Start: 2023-11-15 | End: 2023-12-15

## 2023-11-15 RX ADMIN — DIPHENHYDRAMINE HYDROCHLORIDE 25 MG: 50 INJECTION, SOLUTION INTRAMUSCULAR; INTRAVENOUS at 07:43

## 2023-11-15 RX ADMIN — MORPHINE SULFATE 2 MG: 2 INJECTION, SOLUTION INTRAMUSCULAR; INTRAVENOUS at 13:11

## 2023-11-15 RX ADMIN — SODIUM CHLORIDE, PRESERVATIVE FREE 10 ML: 5 INJECTION INTRAVENOUS at 23:27

## 2023-11-15 RX ADMIN — CELECOXIB 200 MG: 200 CAPSULE ORAL at 06:48

## 2023-11-15 RX ADMIN — SODIUM CHLORIDE, POTASSIUM CHLORIDE, SODIUM LACTATE AND CALCIUM CHLORIDE: 600; 310; 30; 20 INJECTION, SOLUTION INTRAVENOUS at 07:52

## 2023-11-15 RX ADMIN — MIDAZOLAM HYDROCHLORIDE 2 MG: 1 INJECTION, SOLUTION INTRAMUSCULAR; INTRAVENOUS at 07:15

## 2023-11-15 RX ADMIN — PROPOFOL 50 MG: 10 INJECTION, EMULSION INTRAVENOUS at 07:37

## 2023-11-15 RX ADMIN — TRANEXAMIC ACID 1000 MG: 100 INJECTION, SOLUTION INTRAVENOUS at 08:28

## 2023-11-15 RX ADMIN — OXYCODONE 10 MG: 5 TABLET ORAL at 21:23

## 2023-11-15 RX ADMIN — Medication 80 MCG: at 07:39

## 2023-11-15 RX ADMIN — ACETAMINOPHEN 1000 MG: 500 TABLET ORAL at 06:48

## 2023-11-15 RX ADMIN — KETOROLAC TROMETHAMINE 15 MG: 30 INJECTION, SOLUTION INTRAMUSCULAR; INTRAVENOUS at 23:27

## 2023-11-15 RX ADMIN — ROPINIROLE HYDROCHLORIDE 3 MG: 1 TABLET, FILM COATED ORAL at 21:23

## 2023-11-15 RX ADMIN — SERTRALINE 200 MG: 50 TABLET, FILM COATED ORAL at 21:22

## 2023-11-15 RX ADMIN — SODIUM CHLORIDE, POTASSIUM CHLORIDE, SODIUM LACTATE AND CALCIUM CHLORIDE: 600; 310; 30; 20 INJECTION, SOLUTION INTRAVENOUS at 06:45

## 2023-11-15 RX ADMIN — OXYCODONE 5 MG: 5 TABLET ORAL at 09:04

## 2023-11-15 RX ADMIN — SODIUM CHLORIDE: 9 INJECTION, SOLUTION INTRAVENOUS at 10:46

## 2023-11-15 RX ADMIN — ACETAMINOPHEN 1000 MG: 500 TABLET ORAL at 14:46

## 2023-11-15 RX ADMIN — OXYCODONE 10 MG: 5 TABLET ORAL at 14:47

## 2023-11-15 RX ADMIN — OXYCODONE HYDROCHLORIDE 5 MG: 5 TABLET ORAL at 10:54

## 2023-11-15 RX ADMIN — ASPIRIN 81 MG: 81 TABLET, COATED ORAL at 21:22

## 2023-11-15 RX ADMIN — FAMOTIDINE 20 MG: 20 TABLET, FILM COATED ORAL at 21:22

## 2023-11-15 RX ADMIN — WATER 2000 MG: 1 INJECTION INTRAMUSCULAR; INTRAVENOUS; SUBCUTANEOUS at 07:38

## 2023-11-15 RX ADMIN — ONDANSETRON 4 MG: 2 INJECTION INTRAMUSCULAR; INTRAVENOUS at 07:51

## 2023-11-15 RX ADMIN — PROPOFOL 100 MCG/KG/MIN: 10 INJECTION, EMULSION INTRAVENOUS at 07:34

## 2023-11-15 RX ADMIN — TRANEXAMIC ACID 1000 MG: 100 INJECTION, SOLUTION INTRAVENOUS at 07:40

## 2023-11-15 RX ADMIN — LAMOTRIGINE 100 MG: 100 TABLET ORAL at 21:22

## 2023-11-15 RX ADMIN — SODIUM CHLORIDE, PRESERVATIVE FREE 10 ML: 5 INJECTION INTRAVENOUS at 23:09

## 2023-11-15 RX ADMIN — WATER 2000 MG: 1 INJECTION INTRAMUSCULAR; INTRAVENOUS; SUBCUTANEOUS at 17:25

## 2023-11-15 RX ADMIN — MEPIVACAINE HYDROCHLORIDE 52 MG: 20 INJECTION, SOLUTION EPIDURAL; INFILTRATION at 07:20

## 2023-11-15 RX ADMIN — KETOROLAC TROMETHAMINE 15 MG: 30 INJECTION, SOLUTION INTRAMUSCULAR; INTRAVENOUS at 17:25

## 2023-11-15 RX ADMIN — DIPHENHYDRAMINE HYDROCHLORIDE 25 MG: 50 INJECTION, SOLUTION INTRAMUSCULAR; INTRAVENOUS at 07:47

## 2023-11-15 RX ADMIN — WATER 2000 MG: 1 INJECTION INTRAMUSCULAR; INTRAVENOUS; SUBCUTANEOUS at 23:27

## 2023-11-15 RX ADMIN — ROPIVACAINE HYDROCHLORIDE 15 ML: 5 INJECTION, SOLUTION EPIDURAL; INFILTRATION; PERINEURAL at 07:25

## 2023-11-15 RX ADMIN — SODIUM CHLORIDE, POTASSIUM CHLORIDE, SODIUM LACTATE AND CALCIUM CHLORIDE: 600; 310; 30; 20 INJECTION, SOLUTION INTRAVENOUS at 08:40

## 2023-11-15 RX ADMIN — DEXAMETHASONE SODIUM PHOSPHATE 8 MG: 4 INJECTION INTRA-ARTICULAR; INTRALESIONAL; INTRAMUSCULAR; INTRAVENOUS; SOFT TISSUE at 07:45

## 2023-11-15 RX ADMIN — DOCUSATE SODIUM 50MG AND SENNOSIDES 8.6MG 1 TABLET: 8.6; 5 TABLET, FILM COATED ORAL at 23:09

## 2023-11-15 RX ADMIN — ACETAMINOPHEN 1000 MG: 500 TABLET ORAL at 21:23

## 2023-11-15 RX ADMIN — Medication 80 MCG: at 08:23

## 2023-11-15 ASSESSMENT — PAIN SCALES - GENERAL
PAINLEVEL_OUTOF10: 4
PAINLEVEL_OUTOF10: 2
PAINLEVEL_OUTOF10: 2
PAINLEVEL_OUTOF10: 6
PAINLEVEL_OUTOF10: 2
PAINLEVEL_OUTOF10: 8
PAINLEVEL_OUTOF10: 8
PAINLEVEL_OUTOF10: 7
PAINLEVEL_OUTOF10: 8

## 2023-11-15 ASSESSMENT — PAIN DESCRIPTION - DESCRIPTORS
DESCRIPTORS: ACHING;DISCOMFORT;SORE;THROBBING;TIGHTNESS
DESCRIPTORS: ACHING
DESCRIPTORS: ACHING
DESCRIPTORS: ACHING;THROBBING
DESCRIPTORS: ACHING
DESCRIPTORS: ACHING

## 2023-11-15 ASSESSMENT — PAIN DESCRIPTION - ORIENTATION
ORIENTATION: RIGHT
ORIENTATION: ANTERIOR
ORIENTATION: LEFT
ORIENTATION: RIGHT
ORIENTATION: RIGHT

## 2023-11-15 ASSESSMENT — PAIN - FUNCTIONAL ASSESSMENT
PAIN_FUNCTIONAL_ASSESSMENT: 0-10
PAIN_FUNCTIONAL_ASSESSMENT: PREVENTS OR INTERFERES SOME ACTIVE ACTIVITIES AND ADLS

## 2023-11-15 ASSESSMENT — PAIN DESCRIPTION - LOCATION
LOCATION: KNEE
LOCATION: KNEE
LOCATION: LEG;KNEE
LOCATION: HEAD
LOCATION: KNEE

## 2023-11-15 NOTE — ANESTHESIA PROCEDURE NOTES
Spinal Block    Patient location during procedure: pre-op  End time: 11/15/2023 7:20 AM  Reason for block: primary anesthetic  Staffing  Performed: anesthesiologist   Anesthesiologist: Fabi Sweet MD  Performed by: Fabi Sweet MD  Authorized by: Fabi Sweet MD    Spinal Block  Patient position: sitting  Prep: DuraPrep  Patient monitoring: cardiac monitor, continuous pulse ox, frequent blood pressure checks and oxygen  Approach: midline  Location: L4/L5  Provider prep: mask and sterile gloves  Local infiltration: lidocaine  Needle  Needle type: Pencan   Needle gauge: 25 G  Needle length: 3.5 in  Assessment  Sensory level: T4  Events: None  Swirl obtained: Yes  CSF: clear  Attempts: 1  Hemodynamics: stable  Preanesthetic Checklist  Completed: patient identified, IV checked, site marked, risks and benefits discussed, surgical/procedural consents, equipment checked, pre-op evaluation, timeout performed, anesthesia consent given, oxygen available, monitors applied/VS acknowledged and fire risk safety assessment completed and verbalized

## 2023-11-15 NOTE — ANESTHESIA PROCEDURE NOTES
Peripheral Block    Patient location during procedure: pre-op  Reason for block: post-op pain management and at surgeon's request  Start time: 11/15/2023 7:20 AM  End time: 11/15/2023 7:25 AM  Staffing  Performed: anesthesiologist   Anesthesiologist: Natacha Badillo MD  Performed by: Natacha Badillo MD  Authorized by: Natacha Badillo MD    Preanesthetic Checklist  Completed: patient identified, IV checked, site marked, risks and benefits discussed, surgical/procedural consents, equipment checked, pre-op evaluation, timeout performed, anesthesia consent given, oxygen available, monitors applied/VS acknowledged and fire risk safety assessment completed and verbalized  Peripheral Block   Patient position: supine  Prep: ChloraPrep  Provider prep: mask and sterile gloves  Patient monitoring: cardiac monitor, continuous pulse ox, frequent blood pressure checks, IV access and oxygen  Block type: Saphenous  Laterality: right  Injection technique: single-shot  Guidance: ultrasound guided  Local infiltration: lidocaine  Local infiltration: lidocaine    Needle   Needle type: insulated echogenic nerve stimulator needle   Needle gauge: 21 G  Needle localization: anatomical landmarks and ultrasound guidance  Needle length: 10 cm  Assessment   Injection assessment: negative aspiration for heme, no paresthesia on injection, local visualized surrounding nerve on ultrasound and no intravascular symptoms  Paresthesia pain: none  Slow fractionated injection: yes  Hemodynamics: stable  Real-time US image taken/store: yes  Outcomes: uncomplicated and patient tolerated procedure well    Medications Administered  ropivacaine (NAROPIN) injection 0.5% - Perineural   15 mL - 11/15/2023 7:25:00 AM

## 2023-11-15 NOTE — FLOWSHEET NOTE
11/15/23 0855   Handoff   Communication Given Periop Handoff/Relief   Handoff phase Phase I receiving   Handoff Given To [facility] PACU RN   Handoff Received From OR RN/ [facility] CRNA   Handoff Communication Face to Face; At bedside   Time Handoff Given 4094     0930: Xray done at bedside    0945: Sign out received from Dr. Ricky Levi       11/15/23 1018   Handoff   Communication Given Transfer Handoff   Handoff phase Phase I transferring   Handoff Given To [address] Received From [facility] PACU RN   Handoff Communication Telephone   Time Handoff Given 4162

## 2023-11-15 NOTE — CARE COORDINATION
CM met with pt and family member at bedside, introduced role, & verified demographic information (address/insurance/emergency contact) is up-to-date in the chart. D/c plan discussed.     Transportation for d/c: Family at bedside  Support post d/c: Cousin  Does pt own DME needed for d/c: RW at bedside  Accepting [facility] agency: [address] offered: 11/15/23      DAISHA Warner, [facility] [address]

## 2023-11-15 NOTE — ANESTHESIA POSTPROCEDURE EVALUATION
Department of Anesthesiology  Postprocedure Note    Patient: Conor Sharpe  MRN: [id number]  YOB: 1960  Date of evaluation: 11/15/2023      Procedure Summary     Date: 11/15/23 Room / Location: [facility] MAIN OR 7 / [facility] MAIN OR    Anesthesia Start: 0729 Anesthesia Stop: 5387    Procedure: RIGHT TOTAL KNEE ARTHROPLASTY (WILSON) (Right: Knee) Diagnosis:       Osteoarthritis of right knee, unspecified osteoarthritis type      (Osteoarthritis of right knee, unspecified osteoarthritis type [M17.11])    Providers: Pardeep Salcedo MD Responsible Provider: Roscoe Bustamante MD    Anesthesia Type: MAC, spinal ASA Status: 2          Anesthesia Type: No value filed.     Edson Phase I: Edson Score: 8    Edson Phase II:        Anesthesia Post Evaluation    Patient location during evaluation: PACU  Patient participation: complete - patient participated  Level of consciousness: sleepy but conscious  Airway patency: patent  Nausea & Vomiting: no nausea and no vomiting  Complications: no  Cardiovascular status: hemodynamically stable  Respiratory status: acceptable and room air  Hydration status: stable  Multimodal analgesia pain management approach

## 2023-11-15 NOTE — H&P
permanent removal of components without a functioning prosthetic joint in place. Blood clot - the surgery itself and subsequent lack of normal mobility increases the risk of blood clot which in more severe cases can cause extended morbidity and even death. If a blood clot occurs, it may require extended medical treatment. We will prescribe a blood thinner after surgery to mitigate this risk, but this does not guarantee that it cannot happen. Blood thinners themselves can cause side effects, such as a hematoma or wound problems. Leg Length Inequality - efforts are made to equalize leg lengths during joint replacement, but there can be real or perceived leg length inequality that can occur with prosthetic joint replacements. In some cases, increasing or decreasing leg lengths can be the only option available to make the joint stable or well functioning. This can be addressed postoperatively with a shoe lift if it does occur. Damage to blood vessels (vascular injury) - although rare, if this occurs, the results can be devastating and usually require further treatment, such as another surgery to repair the blood vessel and reestablish blood flow. Damage to nerves (neurologic injury) - also rare, if this occurs, the results can include decreased function, chronic pain, or disability. Instability - with the implantation of a prosthetic joint, instability, or the joint being loose can occur. This can result in dislocation or pain if it occurs, and would likely require further treatment, including further surgery. Blood loss - a concern with any surgery, it can require the possibility of a transfusion in more significant cases. Fracture - this can occur during the surgery, shortly after surgery, or at any point in the future. A fracture around a prosthetic joint can oftentimes be more difficult to treat than fractures that might occur if the prosthetic joint were not present.  It can often

## 2023-11-15 NOTE — OP NOTE
OPERATIVE REPORT    FACILITY: [facility]    PATIENT NAME: Oneil Warren     DATE OF OPERATION: 11/15/23    PREOPERATIVE DIAGNOSIS: Right knee end stage osteoarthritis    POSTOPERATIVE DIAGNOSIS: same    SURGERIES PERFORMED:   Right total knee arthroplasty    ATTENDING PHYSICIAN: Kar Quan MD    ASSISTANT: Adrian Minor    IMPLANTS:    Pretty Triathlon press fit size 2 femur, 3 tibia, 10 mm CS poly, 31 patella    SPECIMENS:  none    OPERATIVE FINDINGS: End stage osteoarthritis. Stable total knee at conclusion. ANESTHESIA: spinal plus regional    FLUIDS: Please see anesthesia record    ESTIMATED BLOOD LOSS: 25     TOURNIQUET TIME: 39 min at 250 mmHg    INDICATIONS FOR PROCEDURE: Oneil Warren is a 70 y.o. female who presented to clinic with right knee pain. Radiographs demonstrated advanced arthritic changes of the affected knee. They were counseled on the risks, benefits, and alternatives to surgery. Risks were outlined to include, but were not limited to: bleeding, infection, fracture, damage to blood vessels or nerves, hardware failure, loosening, continued pain, stiffness, leg length inequality, and medical risks including heart attack, stroke, blood clot, and even death. The patient elected to continue with the operation, and gave informed consent to do so. DETAILED DESCRIPTION OF PROCEDURE:   The patient was identified in the preoperative holding area. The operative site was marked. The nature of procedure was reviewed and informed consent was confirmed. The patient was evaluated by anesthesia and a regional block was completed. The patient was subsequently taken to the operating room and anesthesia was administered. The patient was positioned supine and a nonsterile thigh tourniquet was placed. The lower extremity was prepped and draped in a standard fashion. Preoperative antibiotics were administered. A time-out was performed. A standard midline incision was made.  Sharp dissection was taken down

## 2023-11-16 VITALS
SYSTOLIC BLOOD PRESSURE: 140 MMHG | RESPIRATION RATE: 16 BRPM | WEIGHT: 187.61 LBS | TEMPERATURE: 97.3 F | OXYGEN SATURATION: 95 % | BODY MASS INDEX: 33.24 KG/M2 | HEART RATE: 81 BPM | DIASTOLIC BLOOD PRESSURE: 65 MMHG | HEIGHT: 63 IN

## 2023-11-16 PROCEDURE — G0378 HOSPITAL OBSERVATION PER HR: HCPCS

## 2023-11-16 PROCEDURE — 97530 THERAPEUTIC ACTIVITIES: CPT

## 2023-11-16 PROCEDURE — APPNB60 APP NON BILLABLE TIME 46-60 MINS: Performed by: NURSE PRACTITIONER

## 2023-11-16 PROCEDURE — 2580000003 HC RX 258: Performed by: ORTHOPAEDIC SURGERY

## 2023-11-16 PROCEDURE — 97116 GAIT TRAINING THERAPY: CPT

## 2023-11-16 PROCEDURE — 6360000002 HC RX W HCPCS: Performed by: ORTHOPAEDIC SURGERY

## 2023-11-16 PROCEDURE — 6370000000 HC RX 637 (ALT 250 FOR IP): Performed by: ORTHOPAEDIC SURGERY

## 2023-11-16 RX ORDER — OXYCODONE HYDROCHLORIDE 5 MG/1
10 TABLET ORAL EVERY 4 HOURS PRN
Qty: 42 TABLET | Refills: 0 | Status: SHIPPED | OUTPATIENT
Start: 2023-11-16 | End: 2023-11-23

## 2023-11-16 RX ORDER — TRANEXAMIC ACID 650 MG/1
1950 TABLET ORAL
Qty: 6 TABLET | Refills: 0 | Status: SHIPPED | OUTPATIENT
Start: 2023-11-16 | End: 2023-11-18

## 2023-11-16 RX ADMIN — OXYCODONE 10 MG: 5 TABLET ORAL at 01:52

## 2023-11-16 RX ADMIN — POLYETHYLENE GLYCOL (3350) 17 G: 17 POWDER, FOR SOLUTION ORAL at 09:18

## 2023-11-16 RX ADMIN — TRANEXAMIC ACID 1950 MG: 650 TABLET ORAL at 09:19

## 2023-11-16 RX ADMIN — SODIUM CHLORIDE, PRESERVATIVE FREE 10 ML: 5 INJECTION INTRAVENOUS at 09:21

## 2023-11-16 RX ADMIN — OXYCODONE 10 MG: 5 TABLET ORAL at 05:57

## 2023-11-16 RX ADMIN — DEXAMETHASONE SODIUM PHOSPHATE 10 MG: 4 INJECTION INTRA-ARTICULAR; INTRALESIONAL; INTRAMUSCULAR; INTRAVENOUS; SOFT TISSUE at 09:18

## 2023-11-16 RX ADMIN — FAMOTIDINE 20 MG: 20 TABLET, FILM COATED ORAL at 09:19

## 2023-11-16 RX ADMIN — KETOROLAC TROMETHAMINE 15 MG: 30 INJECTION, SOLUTION INTRAMUSCULAR; INTRAVENOUS at 05:58

## 2023-11-16 RX ADMIN — ASPIRIN 81 MG: 81 TABLET, COATED ORAL at 09:19

## 2023-11-16 RX ADMIN — CELECOXIB 100 MG: 100 CAPSULE ORAL at 09:19

## 2023-11-16 RX ADMIN — DOCUSATE SODIUM 50MG AND SENNOSIDES 8.6MG 1 TABLET: 8.6; 5 TABLET, FILM COATED ORAL at 09:19

## 2023-11-16 RX ADMIN — ACETAMINOPHEN 1000 MG: 500 TABLET ORAL at 05:56

## 2023-11-16 ASSESSMENT — PAIN DESCRIPTION - DESCRIPTORS
DESCRIPTORS: ACHING

## 2023-11-16 ASSESSMENT — PAIN DESCRIPTION - LOCATION
LOCATION: KNEE
LOCATION: LEG;KNEE
LOCATION: KNEE;LEG

## 2023-11-16 ASSESSMENT — PAIN - FUNCTIONAL ASSESSMENT
PAIN_FUNCTIONAL_ASSESSMENT: PREVENTS OR INTERFERES SOME ACTIVE ACTIVITIES AND ADLS
PAIN_FUNCTIONAL_ASSESSMENT: PREVENTS OR INTERFERES SOME ACTIVE ACTIVITIES AND ADLS

## 2023-11-16 ASSESSMENT — PAIN SCALES - GENERAL
PAINLEVEL_OUTOF10: 4
PAINLEVEL_OUTOF10: 0
PAINLEVEL_OUTOF10: 5
PAINLEVEL_OUTOF10: 8
PAINLEVEL_OUTOF10: 7
PAINLEVEL_OUTOF10: 4
PAINLEVEL_OUTOF10: 5

## 2023-11-16 ASSESSMENT — PAIN DESCRIPTION - ORIENTATION
ORIENTATION: RIGHT
ORIENTATION: RIGHT;POSTERIOR
ORIENTATION: RIGHT

## 2023-12-11 ENCOUNTER — CLINICAL DOCUMENTATION (OUTPATIENT)
Age: 63
End: 2023-12-11

## 2024-01-16 ENCOUNTER — TELEPHONE (OUTPATIENT)
Age: 64
End: 2024-01-16

## 2024-01-16 ENCOUNTER — NURSE TRIAGE (OUTPATIENT)
Dept: OTHER | Facility: CLINIC | Age: 64
End: 2024-01-16

## 2024-01-16 NOTE — TELEPHONE ENCOUNTER
Triage nurse states that patient need to be seen within the next three days for dizziness please give her a call @ [phone]

## 2024-01-16 NOTE — TELEPHONE ENCOUNTER
Received call from Gibson at [facility], caller not on line.     Complaint: lightheaded, dizzy    Practice Name: [facility]    Caller's telephone number verified as [phone]    Connected with caller via phone, please see below triage    [facility]    Current Symptoms: Was taking down valentina decoration on January 2nd and felt like she was leaning. Walked into the house and went down.States she didn't pass out. She fell from being off balance. She was also incontinent of her bowels at that time. A couple weeks before that she had another similar episode but was aphasic for a short time.    She denies that it's dizziness or lightheadedness. Just states it feels like she's leaning. States she has been evaluated in the ER and CT scans have been negative.    Pt has had several episodes of this over the past 2 years but is now occurring more frequently    States has been trying to schedule an appointment for 4 days with her doctor.    Onset: 2 weeks ago; sudden    Associated Symptoms: NA    Pain Severity: 0/10; N/A; none    Temperature: Denies fever     What has been tried: Nothing    LMP: NA Pregnant: NA    Recommended disposition: See PCP within 3 Days    Care advice provided, patient verbalizes understanding; denies any other questions or concerns; instructed to call back for any new or worsening symptoms.    Patient/Caller agrees with recommended disposition; writer provided warm transfer to Jessica at [facility] for appointment scheduling    Attention Provider:  Thank you for allowing me to participate in the care of your patient.  The patient was connected to triage in response to information provided to the [facility].  Please do not respond through this encounter as the response is not directed to a shared pool.      Reason for Disposition   Loss of speech or garbled speech is a chronic symptom (recurrent or ongoing problem lasting > 4 weeks)    Protocols used: Neurologic

## 2024-01-17 NOTE — TELEPHONE ENCOUNTER
Returned call to pt. C/o dizziness, lightheaded sensation when standing and slurred speech x 3 days,   advised pt to go to ER . Pt stated understanding

## 2024-01-30 DIAGNOSIS — R11.10 DRY HEAVES: ICD-10-CM

## 2024-01-31 RX ORDER — PANTOPRAZOLE SODIUM 40 MG/1
TABLET, DELAYED RELEASE ORAL
Qty: 90 TABLET | Refills: 1 | Status: SHIPPED | OUTPATIENT
Start: 2024-01-31

## 2024-01-31 NOTE — TELEPHONE ENCOUNTER
Last appointment: 11/2/23  Next appointment: none  Previous refill encounter(s): 8/4/23 #90 with 1 refill    Requested Prescriptions     Pending Prescriptions Disp Refills    pantoprazole (PROTONIX) 40 MG tablet [Pharmacy Med Name: PANTOPRAZOLE SOD DR 40 MG TAB] 90 tablet 1     Sig: TAKE 1 TABLET BY MOUTH EVERY DAY         For Pharmacy Admin Tracking Only    Program: Medication Refill  CPA in place:    Recommendation Provided To:   Intervention Detail: New Rx: 1, reason: Patient Preference  Intervention Accepted By:   Gap Closed?:    Time Spent (min): 5

## 2024-05-01 ENCOUNTER — HOSPITAL ENCOUNTER (OUTPATIENT)
Facility: HOSPITAL | Age: 64
Discharge: HOME | End: 2024-05-04
Payer: MEDICARE

## 2024-05-01 DIAGNOSIS — M17.12 ARTHRITIS OF LEFT KNEE: ICD-10-CM

## 2024-05-01 PROCEDURE — 73700 CT LOWER EXTREMITY W/O DYE: CPT

## 2024-05-09 ENCOUNTER — OFFICE VISIT (OUTPATIENT)
Age: 64
End: 2024-05-09
Payer: MEDICARE

## 2024-05-09 VITALS
SYSTOLIC BLOOD PRESSURE: 133 MMHG | OXYGEN SATURATION: 96 % | HEART RATE: 75 BPM | RESPIRATION RATE: 16 BRPM | HEIGHT: 63 IN | TEMPERATURE: 97.6 F | WEIGHT: 180.12 LBS | DIASTOLIC BLOOD PRESSURE: 87 MMHG | BODY MASS INDEX: 31.91 KG/M2

## 2024-05-09 DIAGNOSIS — Z12.31 ENCOUNTER FOR SCREENING MAMMOGRAM FOR MALIGNANT NEOPLASM OF BREAST: ICD-10-CM

## 2024-05-09 DIAGNOSIS — Z00.00 MEDICARE ANNUAL WELLNESS VISIT, SUBSEQUENT: Primary | ICD-10-CM

## 2024-05-09 DIAGNOSIS — Z12.11 COLON CANCER SCREENING: ICD-10-CM

## 2024-05-09 DIAGNOSIS — F19.94 OTHER PSYCHOACTIVE SUBSTANCE USE, UNSPECIFIED WITH PSYCHOACTIVE SUBSTANCE-INDUCED MOOD DISORDER (HCC): ICD-10-CM

## 2024-05-09 DIAGNOSIS — F31.9 BIPOLAR AFFECTIVE DISORDER, REMISSION STATUS UNSPECIFIED (HCC): ICD-10-CM

## 2024-05-09 DIAGNOSIS — E78.2 MIXED HYPERLIPIDEMIA: ICD-10-CM

## 2024-05-09 DIAGNOSIS — G40.209 LOCALIZATION-RELATED (FOCAL) (PARTIAL) SYMPTOMATIC EPILEPSY AND EPILEPTIC SYNDROMES WITH COMPLEX PARTIAL SEIZURES, NOT INTRACTABLE, WITHOUT STATUS EPILEPTICUS (HCC): ICD-10-CM

## 2024-05-09 DIAGNOSIS — R73.09 ABNORMAL GLUCOSE: ICD-10-CM

## 2024-05-09 DIAGNOSIS — Z78.0 ASYMPTOMATIC MENOPAUSAL STATE: ICD-10-CM

## 2024-05-09 LAB
ALBUMIN SERPL-MCNC: 4.2 G/DL (ref 3.5–5)
ALBUMIN/GLOB SERPL: 1.6 (ref 1.1–2.2)
ALP SERPL-CCNC: 181 U/L (ref 45–117)
ALT SERPL-CCNC: 57 U/L (ref 12–78)
ANION GAP SERPL CALC-SCNC: 6 MMOL/L (ref 5–15)
AST SERPL-CCNC: 27 U/L (ref 15–37)
BILIRUB SERPL-MCNC: 0.5 MG/DL (ref 0.2–1)
BUN SERPL-MCNC: 17 MG/DL (ref 6–20)
BUN/CREAT SERPL: 24 (ref 12–20)
CALCIUM SERPL-MCNC: 9.8 MG/DL (ref 8.5–10.1)
CHLORIDE SERPL-SCNC: 107 MMOL/L (ref 97–108)
CHOLEST SERPL-MCNC: 215 MG/DL
CO2 SERPL-SCNC: 28 MMOL/L (ref 21–32)
CREAT SERPL-MCNC: 0.71 MG/DL (ref 0.55–1.02)
GLOBULIN SER CALC-MCNC: 2.6 G/DL (ref 2–4)
GLUCOSE SERPL-MCNC: 102 MG/DL (ref 65–100)
HDLC SERPL-MCNC: 125 MG/DL
HDLC SERPL: 1.7 (ref 0–5)
LDLC SERPL CALC-MCNC: 71.4 MG/DL (ref 0–100)
POTASSIUM SERPL-SCNC: 4.9 MMOL/L (ref 3.5–5.1)
PROT SERPL-MCNC: 6.8 G/DL (ref 6.4–8.2)
SODIUM SERPL-SCNC: 141 MMOL/L (ref 136–145)
TRIGL SERPL-MCNC: 93 MG/DL
VLDLC SERPL CALC-MCNC: 18.6 MG/DL

## 2024-05-09 PROCEDURE — 99213 OFFICE O/P EST LOW 20 MIN: CPT | Performed by: STUDENT IN AN ORGANIZED HEALTH CARE EDUCATION/TRAINING PROGRAM

## 2024-05-09 PROCEDURE — 3017F COLORECTAL CA SCREEN DOC REV: CPT | Performed by: STUDENT IN AN ORGANIZED HEALTH CARE EDUCATION/TRAINING PROGRAM

## 2024-05-09 PROCEDURE — G8400 PT W/DXA NO RESULTS DOC: HCPCS | Performed by: STUDENT IN AN ORGANIZED HEALTH CARE EDUCATION/TRAINING PROGRAM

## 2024-05-09 PROCEDURE — G0439 PPPS, SUBSEQ VISIT: HCPCS | Performed by: STUDENT IN AN ORGANIZED HEALTH CARE EDUCATION/TRAINING PROGRAM

## 2024-05-09 PROCEDURE — 1123F ACP DISCUSS/DSCN MKR DOCD: CPT | Performed by: STUDENT IN AN ORGANIZED HEALTH CARE EDUCATION/TRAINING PROGRAM

## 2024-05-09 PROCEDURE — 1036F TOBACCO NON-USER: CPT | Performed by: STUDENT IN AN ORGANIZED HEALTH CARE EDUCATION/TRAINING PROGRAM

## 2024-05-09 PROCEDURE — G8427 DOCREV CUR MEDS BY ELIG CLIN: HCPCS | Performed by: STUDENT IN AN ORGANIZED HEALTH CARE EDUCATION/TRAINING PROGRAM

## 2024-05-09 PROCEDURE — G8417 CALC BMI ABV UP PARAM F/U: HCPCS | Performed by: STUDENT IN AN ORGANIZED HEALTH CARE EDUCATION/TRAINING PROGRAM

## 2024-05-09 PROCEDURE — 1090F PRES/ABSN URINE INCON ASSESS: CPT | Performed by: STUDENT IN AN ORGANIZED HEALTH CARE EDUCATION/TRAINING PROGRAM

## 2024-05-09 SDOH — ECONOMIC STABILITY: INCOME INSECURITY: HOW HARD IS IT FOR YOU TO PAY FOR THE VERY BASICS LIKE FOOD, HOUSING, MEDICAL CARE, AND HEATING?: SOMEWHAT HARD

## 2024-05-09 SDOH — ECONOMIC STABILITY: FOOD INSECURITY: WITHIN THE PAST 12 MONTHS, YOU WORRIED THAT YOUR FOOD WOULD RUN OUT BEFORE YOU GOT MONEY TO BUY MORE.: NEVER TRUE

## 2024-05-09 SDOH — ECONOMIC STABILITY: HOUSING INSECURITY
IN THE LAST 12 MONTHS, WAS THERE A TIME WHEN YOU DID NOT HAVE A STEADY PLACE TO SLEEP OR SLEPT IN A SHELTER (INCLUDING NOW)?: NO

## 2024-05-09 SDOH — ECONOMIC STABILITY: FOOD INSECURITY: WITHIN THE PAST 12 MONTHS, THE FOOD YOU BOUGHT JUST DIDN'T LAST AND YOU DIDN'T HAVE MONEY TO GET MORE.: NEVER TRUE

## 2024-05-09 ASSESSMENT — PATIENT HEALTH QUESTIONNAIRE - PHQ9
9. THOUGHTS THAT YOU WOULD BE BETTER OFF DEAD, OR OF HURTING YOURSELF: NOT AT ALL
SUM OF ALL RESPONSES TO PHQ QUESTIONS 1-9: 0
1. LITTLE INTEREST OR PLEASURE IN DOING THINGS: NOT AT ALL
4. FEELING TIRED OR HAVING LITTLE ENERGY: NOT AT ALL
SUM OF ALL RESPONSES TO PHQ9 QUESTIONS 1 & 2: 0
SUM OF ALL RESPONSES TO PHQ QUESTIONS 1-9: 0
SUM OF ALL RESPONSES TO PHQ QUESTIONS 1-9: 0
2. FEELING DOWN, DEPRESSED OR HOPELESS: NOT AT ALL
SUM OF ALL RESPONSES TO PHQ QUESTIONS 1-9: 0
5. POOR APPETITE OR OVEREATING: NOT AT ALL
10. IF YOU CHECKED OFF ANY PROBLEMS, HOW DIFFICULT HAVE THESE PROBLEMS MADE IT FOR YOU TO DO YOUR WORK, TAKE CARE OF THINGS AT HOME, OR GET ALONG WITH OTHER PEOPLE: NOT DIFFICULT AT ALL
3. TROUBLE FALLING OR STAYING ASLEEP: NOT AT ALL
8. MOVING OR SPEAKING SO SLOWLY THAT OTHER PEOPLE COULD HAVE NOTICED. OR THE OPPOSITE, BEING SO FIGETY OR RESTLESS THAT YOU HAVE BEEN MOVING AROUND A LOT MORE THAN USUAL: NOT AT ALL
6. FEELING BAD ABOUT YOURSELF - OR THAT YOU ARE A FAILURE OR HAVE LET YOURSELF OR YOUR FAMILY DOWN: NOT AT ALL
7. TROUBLE CONCENTRATING ON THINGS, SUCH AS READING THE NEWSPAPER OR WATCHING TELEVISION: NOT AT ALL

## 2024-05-09 NOTE — PROGRESS NOTES
[facility]  [address] [place] [address] [phone]    Date of visit: 5/9/2024    This is an Subsequent Medicare Annual Wellness Visit (AWV), (Performed more than 12 months after effective date of Medicare Part B enrollment and 12 months after last preventive visit, Once in a lifetime)    I have reviewed the patient's medical history in detail and updated the computerized patient record.     Tiffany Watson is a 63 y.o. female   History obtained from: the patient.    Concerns today   (Patient understands that medical problems addressed today may incur additional cost as this is a preventive visit)  -see separate notes    History     Patient Active Problem List   Diagnosis    Mild cognitive disorder    Anxiety    Partial symptomatic epilepsy with complex partial seizures, not intractable, without status epilepticus (HCC)    Substance induced mood disorder (HCC)    RLS (restless legs syndrome)    Depression    Depression with suicidal ideation    Bipolar 1 disorder, depressed (HCC)    Major depression, recurrent (HCC)    Pain, joint, knee, left    H/O gastric bypass    Chronic diarrhea    Post-menopausal bleeding    Primary osteoarthritis of right knee     Past Medical History:   Diagnosis Date    Depression     Localization-related (focal) (partial) epilepsy and epileptic syndromes with complex partial seizures, without mention of intractable epilepsy 2011    Mild cognitive disorder 10/3/2013    Osteoarthritis 2018    PONV (postoperative nausea and vomiting)     Restless leg syndrome       Past Surgical History:   Procedure Laterality Date    BREAST BIOPSY Left 1974    Surgical - 2nd of this yr    BREAST BIOPSY Left 1974    Surgical    BREAST BIOPSY Bilateral 1992    Surgical    BREAST BIOPSY Bilateral 1992    Surgical - 2nd of this yr    BREAST BIOPSY Right     Surgical    BREAST SURGERY      fibroid removal    DILATION AND CURETTAGE OF UTERUS      
Chief Complaint   Patient presents with    Annual Exam     \"Have you been to the ER, urgent care clinic since your last visit?  Hospitalized since your last visit?\"      Yes  11/15/2023   Primary osteoarthritis of right knee   [facility]      “Have you seen or consulted any other health care providers outside of [facility] since your last visit?”      Yes  [facility]  3/26/2024  Moe Keith MD    Acquired deformity of left knee      “Have you had a colorectal cancer screening such as a colonoscopy/FIT/Cologuard?    NO           “Have you had a pap smear?”    NO          Vitals:    24 1439   BP: 133/87   Pulse: 75   Resp: 16   Temp: 97.6 °F (36.4 °C)   SpO2: 96%     Health Maintenance Due   Topic Date Due    HIV screen  Never done    Hepatitis C screen  Never done    Colorectal Cancer Screen  Never done    Shingles vaccine (1 of 2) Never done    DEXA (modify frequency per FRAX score)  Never done    Respiratory Syncytial Virus (RSV) Pregnant or age 60 yrs+ (1 - 1-dose 60+ series) Never done    COVID-19 Vaccine ( - - season) 2023    Pneumococcal 65+ years Vaccine (1 of 1 - PCV) Never done    Annual Wellness Visit (Medicare Advantage)  Never done    Cervical cancer screen  Never done      The patient, Tiffany Watson, identity was verified by name and , pharmacy verified  Labs:Yes  Fasting:No         
    May 9, 2024

## 2024-05-09 NOTE — PATIENT INSTRUCTIONS
possible to meet your calcium requirement with diet alone, but a vitamin D supplement is usually necessary to meet this goal.  When exposed to the sun, use a sunscreen that protects against both UVA and UVB radiation with an SPF of 30 or greater. Reapply every 2 to 3 hours or after sweating, drying off with a towel, or swimming.  Always wear a seat belt when traveling in a car. Always wear a helmet when riding a bicycle or motorcycle.       Learning About Being Active as an Older Adult  Why is being active important as you get older?     Being active is one of the best things you can do for your health. And it's never too late to start. Being active--or getting active, if you aren't already--has definite benefits. It can:  Give you more energy,  Keep your mind sharp.  Improve balance to reduce your risk of falls.  Help you manage chronic illness with fewer medicines.  No matter how old you are, how fit you are, or what health problems you have, there is a form of activity that will work for you. And the more physical activity you can do, the better your overall health will be.  What kinds of activity can help you stay healthy?  Being more active will make your daily activities easier. Physical activity includes planned exercise and things you do in daily life. There are four types of activity:  Aerobic.  Doing aerobic activity makes your heart and lungs strong.  Includes walking, dancing, and gardening.  Aim for at least 2½ hours spread throughout the week.  It improves your energy and can help you sleep better.  Muscle-strengthening.  This type of activity can help maintain muscle and strengthen bones.  Includes climbing stairs, using resistance bands, and lifting or carrying heavy loads.  Aim for at least twice a week.  It can help protect the knees and other joints.  Stretching.  Stretching gives you better range of motion in joints and muscles.  Includes upper arm stretches, calf stretches, and gentle yoga.  Aim

## 2024-05-23 ENCOUNTER — HOSPITAL ENCOUNTER (OUTPATIENT)
Facility: HOSPITAL | Age: 64
Discharge: HOME | End: 2024-05-23
Payer: MEDICARE

## 2024-05-23 ENCOUNTER — TELEPHONE (OUTPATIENT)
Age: 64
End: 2024-05-23

## 2024-05-23 VITALS
WEIGHT: 182.1 LBS | OXYGEN SATURATION: 100 % | DIASTOLIC BLOOD PRESSURE: 78 MMHG | HEIGHT: 64 IN | BODY MASS INDEX: 31.09 KG/M2 | RESPIRATION RATE: 16 BRPM | SYSTOLIC BLOOD PRESSURE: 148 MMHG | HEART RATE: 75 BPM | TEMPERATURE: 98 F

## 2024-05-23 LAB
ABO + RH BLD: NORMAL
ALBUMIN SERPL-MCNC: 3.8 G/DL (ref 3.5–5)
ALBUMIN/GLOB SERPL: 1.2 (ref 1.1–2.2)
ALP SERPL-CCNC: 211 U/L (ref 45–117)
ALT SERPL-CCNC: 201 U/L (ref 12–78)
ANION GAP SERPL CALC-SCNC: 5 MMOL/L (ref 5–15)
APPEARANCE UR: CLEAR
AST SERPL-CCNC: 168 U/L (ref 15–37)
BACTERIA URNS QL MICRO: ABNORMAL /HPF
BILIRUB SERPL-MCNC: 0.6 MG/DL (ref 0.2–1)
BILIRUB UR QL: NEGATIVE
BLOOD GROUP ANTIBODIES SERPL: NORMAL
BUN SERPL-MCNC: 12 MG/DL (ref 6–20)
BUN/CREAT SERPL: 16 (ref 12–20)
CALCIUM SERPL-MCNC: 9.2 MG/DL (ref 8.5–10.1)
CHLORIDE SERPL-SCNC: 109 MMOL/L (ref 97–108)
CO2 SERPL-SCNC: 25 MMOL/L (ref 21–32)
COLOR UR: ABNORMAL
CREAT SERPL-MCNC: 0.74 MG/DL (ref 0.55–1.02)
EPITH CASTS URNS QL MICRO: ABNORMAL /LPF
ERYTHROCYTE [DISTWIDTH] IN BLOOD BY AUTOMATED COUNT: 12.9 % (ref 11.5–14.5)
EST. AVERAGE GLUCOSE BLD GHB EST-MCNC: 85 MG/DL
GLOBULIN SER CALC-MCNC: 3.1 G/DL (ref 2–4)
GLUCOSE SERPL-MCNC: 90 MG/DL (ref 65–100)
GLUCOSE UR STRIP.AUTO-MCNC: NEGATIVE MG/DL
HBA1C MFR BLD: 4.6 % (ref 4–5.6)
HCT VFR BLD AUTO: 40.1 % (ref 35–47)
HGB BLD-MCNC: 12.7 G/DL (ref 11.5–16)
HGB UR QL STRIP: NEGATIVE
INR PPP: 1 (ref 0.9–1.1)
KETONES UR QL STRIP.AUTO: NEGATIVE MG/DL
LEUKOCYTE ESTERASE UR QL STRIP.AUTO: ABNORMAL
MCH RBC QN AUTO: 30.4 PG (ref 26–34)
MCHC RBC AUTO-ENTMCNC: 31.7 G/DL (ref 30–36.5)
MCV RBC AUTO: 95.9 FL (ref 80–99)
MUCOUS THREADS URNS QL MICRO: ABNORMAL /LPF
NITRITE UR QL STRIP.AUTO: NEGATIVE
NRBC # BLD: 0 K/UL (ref 0–0.01)
NRBC BLD-RTO: 0 PER 100 WBC
PH UR STRIP: 5.5 (ref 5–8)
PLATELET # BLD AUTO: 363 K/UL (ref 150–400)
PMV BLD AUTO: 9.8 FL (ref 8.9–12.9)
POTASSIUM SERPL-SCNC: 3.6 MMOL/L (ref 3.5–5.1)
PROT SERPL-MCNC: 6.9 G/DL (ref 6.4–8.2)
PROT UR STRIP-MCNC: ABNORMAL MG/DL
PROTHROMBIN TIME: 10.4 SEC (ref 9–11.1)
RBC # BLD AUTO: 4.18 M/UL (ref 3.8–5.2)
RBC #/AREA URNS HPF: ABNORMAL /HPF (ref 0–5)
SODIUM SERPL-SCNC: 139 MMOL/L (ref 136–145)
SP GR UR REFRACTOMETRY: 1.03
SPECIMEN EXP DATE BLD: NORMAL
URINE CULTURE IF INDICATED: ABNORMAL
UROBILINOGEN UR QL STRIP.AUTO: 1 EU/DL (ref 0.2–1)
WBC # BLD AUTO: 7.9 K/UL (ref 3.6–11)
WBC URNS QL MICRO: ABNORMAL /HPF (ref 0–4)

## 2024-05-23 PROCEDURE — 36415 COLL VENOUS BLD VENIPUNCTURE: CPT

## 2024-05-23 PROCEDURE — APPNB30 APP NON BILLABLE TIME 0-30 MINS: Performed by: NURSE PRACTITIONER

## 2024-05-23 PROCEDURE — 81001 URINALYSIS AUTO W/SCOPE: CPT

## 2024-05-23 PROCEDURE — 85610 PROTHROMBIN TIME: CPT

## 2024-05-23 PROCEDURE — 86901 BLOOD TYPING SEROLOGIC RH(D): CPT

## 2024-05-23 PROCEDURE — 85027 COMPLETE CBC AUTOMATED: CPT

## 2024-05-23 PROCEDURE — 80053 COMPREHEN METABOLIC PANEL: CPT

## 2024-05-23 PROCEDURE — 83036 HEMOGLOBIN GLYCOSYLATED A1C: CPT

## 2024-05-23 PROCEDURE — 86850 RBC ANTIBODY SCREEN: CPT

## 2024-05-23 PROCEDURE — 86900 BLOOD TYPING SEROLOGIC ABO: CPT

## 2024-05-23 RX ORDER — SODIUM CHLORIDE, SODIUM LACTATE, POTASSIUM CHLORIDE, CALCIUM CHLORIDE 600; 310; 30; 20 MG/100ML; MG/100ML; MG/100ML; MG/100ML
INJECTION, SOLUTION INTRAVENOUS CONTINUOUS
Status: CANCELLED | OUTPATIENT
Start: 2024-05-31

## 2024-05-23 RX ORDER — CELECOXIB 200 MG/1
200 CAPSULE ORAL ONCE
Status: CANCELLED | OUTPATIENT
Start: 2024-05-31

## 2024-05-23 RX ORDER — ACETAMINOPHEN 500 MG
1000 TABLET ORAL ONCE
Status: CANCELLED | OUTPATIENT
Start: 2024-05-31

## 2024-05-23 RX ORDER — ZOLPIDEM TARTRATE 10 MG/1
10 TABLET ORAL NIGHTLY PRN
COMMUNITY

## 2024-05-23 ASSESSMENT — KOOS JR
RISING FROM SITTING: MODERATE
STRAIGHTENING KNEE FULLY: MILD
KOOS JR TOTAL INTERVAL SCORE: 63.776
HOW SEVERE IS YOUR KNEE STIFFNESS AFTER FIRST WAKING IN MORNING: MODERATE
BENDING TO THE FLOOR TO PICK UP OBJECT: MODERATE
TWISING OR PIVOTING ON KNEE: MODERATE

## 2024-05-23 ASSESSMENT — PROMIS GLOBAL HEALTH SCALE
TO WHAT EXTENT ARE YOU ABLE TO CARRY OUT YOUR EVERYDAY PHYSICAL ACTIVITIES SUCH AS WALKING, CLIMBING STAIRS, CARRYING GROCERIES, OR MOVING A CHAIR [ON A SCALE OF 1 (NOT AT ALL) TO 5 (COMPLETELY)]?: COMPLETELY
IN GENERAL, PLEASE RATE HOW WELL YOU CARRY OUT YOUR USUAL SOCIAL ACTIVITIES (INCLUDES ACTIVITIES AT HOME, AT WORK, AND IN YOUR COMMUNITY, AND RESPONSIBILITIES AS A PARENT, CHILD, SPOUSE, EMPLOYEE, FRIEND, ETC) [ON A SCALE OF 1 (POOR) TO 5 (EXCELLENT)]?: GOOD
WHO IS THE PERSON COMPLETING THE PROMIS V1.1 SURVEY?: SELF
HOW IS THE PROMIS V1.1 BEING ADMINISTERED?: PAPER
IN GENERAL, HOW WOULD YOU RATE YOUR MENTAL HEALTH, INCLUDING YOUR MOOD AND YOUR ABILITY TO THINK [ON A SCALE OF 1 (POOR) TO 5 (EXCELLENT)]?: GOOD
IN THE PAST 7 DAYS, HOW WOULD YOU RATE YOUR FATIGUE ON AVERAGE [ON A SCALE FROM 1 (NONE) TO 5 (VERY SEVERE)]?: MODERATE
IN GENERAL, HOW WOULD YOU RATE YOUR PHYSICAL HEALTH [ON A SCALE OF 1 (POOR) TO 5 (EXCELLENT)]?: VERY GOOD
IN GENERAL, HOW WOULD YOU RATE YOUR SATISFACTION WITH YOUR SOCIAL ACTIVITIES AND RELATIONSHIPS [ON A SCALE OF 1 (POOR) TO 5 (EXCELLENT)]?: GOOD
IN GENERAL, WOULD YOU SAY YOUR QUALITY OF LIFE IS...[ON A SCALE OF 1 (POOR) TO 5 (EXCELLENT)]: VERY GOOD
IN THE PAST 7 DAYS, HOW OFTEN HAVE YOU BEEN BOTHERED BY EMOTIONAL PROBLEMS, SUCH AS FEELING ANXIOUS, DEPRESSED, OR IRRITABLE [ON A SCALE FROM 1 (NEVER) TO 5 (ALWAYS)]?: SOMETIMES
IN GENERAL, WOULD YOU SAY YOUR HEALTH IS...[ON A SCALE OF 1 (POOR) TO 5 (EXCELLENT)]: VERY GOOD
SUM OF RESPONSES TO QUESTIONS 2, 4, 5, & 10: 13
SUM OF RESPONSES TO QUESTIONS 3, 6, 7, & 8: 15
IN THE PAST 7 DAYS, HOW WOULD YOU RATE YOUR PAIN ON AVERAGE [ON A SCALE FROM 0 (NO PAIN) TO 10 (WORST IMAGINABLE PAIN)]?: 3

## 2024-05-23 ASSESSMENT — PAIN SCALES - GENERAL: PAINLEVEL_OUTOF10: 0

## 2024-05-23 NOTE — TELEPHONE ENCOUNTER
Patient needs to have her liver enzymes checked before her knee surgery, she is having knee surgery done on 05/31/2024.  Please give her a call @ [phone]

## 2024-05-24 LAB
BACTERIA SPEC CULT: NORMAL
BACTERIA SPEC CULT: NORMAL
SERVICE CMNT-IMP: NORMAL

## 2024-05-28 ENCOUNTER — HOSPITAL ENCOUNTER (OUTPATIENT)
Facility: HOSPITAL | Age: 64
Discharge: HOME | End: 2024-05-31
Payer: MEDICARE

## 2024-05-28 LAB
ALBUMIN SERPL-MCNC: 3.6 G/DL (ref 3.5–5)
ALBUMIN/GLOB SERPL: 1.3 (ref 1.1–2.2)
ALP SERPL-CCNC: 161 U/L (ref 45–117)
ALT SERPL-CCNC: 66 U/L (ref 12–78)
ANION GAP SERPL CALC-SCNC: 4 MMOL/L (ref 5–15)
AST SERPL-CCNC: 25 U/L (ref 15–37)
BILIRUB SERPL-MCNC: 0.7 MG/DL (ref 0.2–1)
BUN SERPL-MCNC: 11 MG/DL (ref 6–20)
BUN/CREAT SERPL: 18 (ref 12–20)
CALCIUM SERPL-MCNC: 9.2 MG/DL (ref 8.5–10.1)
CHLORIDE SERPL-SCNC: 112 MMOL/L (ref 97–108)
CO2 SERPL-SCNC: 24 MMOL/L (ref 21–32)
CREAT SERPL-MCNC: 0.62 MG/DL (ref 0.55–1.02)
GLOBULIN SER CALC-MCNC: 2.7 G/DL (ref 2–4)
GLUCOSE SERPL-MCNC: 94 MG/DL (ref 65–100)
POTASSIUM SERPL-SCNC: 3.7 MMOL/L (ref 3.5–5.1)
PROT SERPL-MCNC: 6.3 G/DL (ref 6.4–8.2)
SODIUM SERPL-SCNC: 140 MMOL/L (ref 136–145)

## 2024-05-28 PROCEDURE — 80053 COMPREHEN METABOLIC PANEL: CPT

## 2024-05-28 PROCEDURE — 36415 COLL VENOUS BLD VENIPUNCTURE: CPT

## 2024-05-28 NOTE — PROGRESS NOTES
5/28.  Patient was unable to get an appointment with her PCP to recheck liver enzymes. Patient asked to come in to PAT today to recheck liver enzymes. CMP redrawn and liver enzymes now in normal range. Called patient and notified her.

## 2024-05-29 NOTE — PROGRESS NOTES
[facility]  Joint/Spine Preoperative Instructions        Surgery Date 5/31/24          Time of Arrival to be called on 5/30 between 2-5pm to [phone]     1. On the day of your surgery, please report to the Surgical Services Registration Desk and sign in at your designated time. The Surgery Center is located to the right of the Emergency Room.     2. You must have someone with you to drive you home. You should not drive a car for 24 hours following surgery. Please make arrangements for a friend or family member to stay with you for the first 24 hours after your surgery.    3. No food after midnight 5/30.  Medications morning of surgery should be taken with a sip of water.  Please follow pre-surgery drink instructions that were given at your Pre Admission Testing appointment.      4. We recommend you do not drink any alcoholic beverages for 24 hours before and after your surgery.    5. Contact your surgeon’s office for instructions on the following medications: non-steroidal anti-inflammatory drugs (i.e. Advil, Aleve), vitamins, and supplements. (Some surgeon’s will want you to stop these medications prior to surgery and others may allow you to take them)  **If you are currently taking Plavix, Coumadin, Aspirin and/or other blood-thinning agents, contact your surgeon for instructions.** Your surgeon will partner with the physician prescribing these medications to determine if it is safe to stop or if you need to continue taking.  Please do not stop taking these medications without instructions from your surgeon    6. . Wear comfortable clothes.  Wear glasses instead of contacts.  Do not bring any money or jewelry. Please bring picture ID, insurance card, and any prearranged co-payment or hospital payment.  Do not wear make-up, particularly mascara the morning of your surgery.  Do not wear nail polish, particularly if you are having foot /hand surgery.  Wear your hair loose or down, 
EKG was completed on 11/7/23. Results in epic.  
Hibiclens/Chlorhexidine    Preventing Infections Before and After - Your Surgery    IMPORTANT INSTRUCTIONS    Please read and follow these instructions carefully. If you are unable to comply with the below instructions your procedure will be cancelled.       Every Night for Three (3) nights before your surgery:  Shower with an antibacterial soap, such as Dial, or the soap provided at your preassessment appointment. A shower is better than a bath for cleaning your skin.  If needed, ask someone to help you reach all areas of your body. Don't forget to clean your belly button with every shower.    The night before your surgery:   If you lose your Hibiclens/chlorhexidine please contact surgery center or you can purchase it at a local pharmacy  On the night before your surgery, shower with an antibacterial soap, such as Dial, or the soap provided at your preassessment appointment.   With one packet of Hibiclens/Chlorhexidine in hand, turn water off.  Apply Hibiclens antiseptic skin cleanser with a clean, freshly washed washcloth.  Gently apply to your body from chin to toes (except the genital area) and especially the area(s) where your incision(s) will be.  Leave Hibiclens/Chlorhexidine on your skin for at least 20 seconds.    CAUTION: If needed, Hibiclens/chlorhexidine may be used to clean the folds of skin of the legs (such as in the area of the groin) and on your buttocks and hips. However, do not use Hibiclens/Chlorhexidine above the neck or in the genital area (your bottom) or put inside any area of your body.  Turn the water back on and rinse.  Dry gently with a clean, freshly washed towel.  After your shower, do not use any powder, deodorant, perfumes or lotion.  Use clean, freshly washed towels and washcloths every time you shower.  Wear clean, freshly washed pajamas to bed the night before surgery.  Sleep on clean, freshly washed sheets.  Do not allow pets to sleep in your bed with you.        The Morning of 
Hibiclens/Chlorhexidine    Preventing Infections Before and After - Your Surgery    IMPORTANT INSTRUCTIONS    Please read and follow these instructions carefully. If you are unable to comply with the below instructions your procedure will be cancelled.       Every Night for Three (3) nights before your surgery:  Shower with an antibacterial soap, such as Dial, or the soap provided at your preassessment appointment. A shower is better than a bath for cleaning your skin.  If needed, ask someone to help you reach all areas of your body. Don’t forget to clean your belly button with every shower.    The night before your surgery:   If you lose your Hibiclens/chlorhexidine please contact surgery center or you can purchase it at a local pharmacy  On the night before your surgery, shower with an antibacterial soap, such as Dial, or the soap provided at your preassessment appointment.   With bottle of Hibiclens/Chlorhexidine in hand, turn water off.  Apply Hibiclens antiseptic skin cleanser with a clean, freshly washed washcloth.  Gently apply to your body from chin to toes (except the genital area) and especially the area(s) where your incision(s) will be.  Leave Hibiclens/Chlorhexidine on your skin for at least 20 seconds.    CAUTION: If needed, Hibiclens/chlorhexidine may be used to clean the folds of skin of the legs (such as in the area of the groin) and on your buttocks and hips. However, do not use Hibiclens/Chlorhexidine above the neck or in the genital area (your bottom) or put inside any area of your body.  Turn the water back on and rinse.  Dry gently with a clean, freshly washed towel.  After your shower, do not use any powder, deodorant, perfumes or lotion.  Use clean, freshly washed towels and washcloths every time you shower.  Wear clean, freshly washed pajamas to bed the night before surgery.  Sleep on clean, freshly washed sheets.  Do not allow pets to sleep in your bed with you.        The Morning of your 
Incentive Spirometer        Using the incentive spirometer helps expand the small air sacs of your lungs, helps you breathe deeply, and helps improve your lung function.  Use your incentive spirometer twice a day (10 breaths each time) prior to surgery.      How to Use Your Incentive Spirometer:  Hold the incentive spirometer in an upright position.   Breathe out as usual.   Place the mouthpiece in your mouth and seal your lips tightly around it.   Take a deep breath.  Breathe in slowly and as deeply as possible. Keep the blue flow rate guide between the arrows.   Hold your breath as long as possible. Then exhale slowly and allow the piston to fall to the bottom of the column.   Rest for a few seconds and repeat steps one through five at least 10 times.     PAT Tidal Volume__1500________________  x___2_____________  Date__5/23/24_____________________    BRING THE INCENTIVE SPIROMETER WITH YOU TO THE HOSPITAL ON THE DAY OF YOUR SURGERY.  Opportunity given to ask and answer questions as well as to observe return demonstration.    Patient signature_____________________________    Witness____________________________  
No Nozin ordered for patient due to patients allergy to Benzalkonium Chloride, per Ruma Peña NP.  
Orthopedic and Spine Patients:  Instructions on When You Can   Eat or Drink Before Surgery      You have been provided 2 pre-surgery drinks received at your pre-admission testing appointment.    Night before surgery:  You should drink one bottle of the  pre-surgery drink at bedtime. No food after midnight!    Day of Surgery:  Complete 2nd bottle of the pre-surgery drink 1 hour prior to arrival at hospital.  For questions call Pre-Admission Testing at [phone].  They are available from 8:00am-5:00pm, Monday through Friday.  
Patient ALT and AST levels were elevated from today's lab draw.  and . Discussed with Ruma Peña NP. She stated for patient to schedule an appointment with her PCP prior to surgery for further evaluation. She also instructed for patient to avoid Tylenol and Alcohol.   1500. Spoke with patient's PCP office to get patient an appointment due to elevated liver enzymes on todays labs.  The  states there are no appointments but she will send a message to the nurse to try to get patient an appointment. They will call patient. Also called patient and left voice mail message telling her that her liver enzymes were elevated and Ruma Peña NP wants her to see her PCP prior to surgery. Also left message telling her to avoid alcohol and tylenol. Asked for call back.   
Per Dr. Keith discontinue Lyrica order.  
abnormally low

## 2024-05-30 ENCOUNTER — ANESTHESIA EVENT (OUTPATIENT)
Facility: HOSPITAL | Age: 64
End: 2024-05-30
Payer: MEDICARE

## 2024-05-30 NOTE — ANESTHESIA PRE PROCEDURE
CURETTAGE OF UTERUS      GASTRIC BYPASS SURGERY      ORTHOPEDIC SURGERY      radha. heel spurs    TONSILLECTOMY      TOTAL KNEE ARTHROPLASTY Right 11/15/2023    RIGHT TOTAL KNEE ARTHROPLASTY WITH MAKOPLASTY performed by Moe Keith MD at [facility] MAIN OR       Social History:    Social History     Tobacco Use    Smoking status: Never    Smokeless tobacco: Never   Substance Use Topics    Alcohol use: Yes     Alcohol/week: 2.0 standard drinks of alcohol     Types: 2 Shots of liquor per week     Comment: ocassional                                Counseling given: Not Answered      Vital Signs (Current): There were no vitals filed for this visit.                                           BP Readings from Last 3 Encounters:   05/23/24 (!) 148/78   05/09/24 133/87   11/16/23 (!) 140/65       NPO Status:                                                                                 BMI:   Wt Readings from Last 3 Encounters:   05/23/24 82.6 kg (182 lb 1.6 oz)   05/09/24 81.7 kg (180 lb 1.9 oz)   11/15/23 85.1 kg (187 lb 9.8 oz)     There is no height or weight on file to calculate BMI.    CBC:   Lab Results   Component Value Date/Time    WBC 7.9 05/23/2024 10:10 AM    RBC 4.18 05/23/2024 10:10 AM    HGB 12.7 05/23/2024 10:10 AM    HCT 40.1 05/23/2024 10:10 AM    MCV 95.9 05/23/2024 10:10 AM    RDW 12.9 05/23/2024 10:10 AM     05/23/2024 10:10 AM       CMP:   Lab Results   Component Value Date/Time     05/28/2024 01:17 PM    K 3.7 05/28/2024 01:17 PM     05/28/2024 01:17 PM    CO2 24 05/28/2024 01:17 PM    BUN 11 05/28/2024 01:17 PM    CREATININE 0.62 05/28/2024 01:17 PM    AGRATIO 1.4 12/19/2022 09:45 AM    LABGLOM >90 05/28/2024 01:17 PM    LABGLOM >60 11/07/2023 08:02 AM    LABGLOM >60 12/19/2022 09:45 AM    GLUCOSE 94 05/28/2024 01:17 PM    CALCIUM 9.2 05/28/2024 01:17 PM    BILITOT 0.7 05/28/2024 01:17 PM    ALKPHOS 161 05/28/2024 01:17 PM    ALKPHOS 193 12/19/2022 09:45 AM    AST 25 05/28/2024 01:17 PM

## 2024-05-31 ENCOUNTER — HOSPITAL ENCOUNTER (OUTPATIENT)
Facility: HOSPITAL | Age: 64
Setting detail: OBSERVATION
Discharge: HOME HEALTH | End: 2024-05-31
Attending: ORTHOPAEDIC SURGERY | Admitting: ORTHOPAEDIC SURGERY
Payer: MEDICARE

## 2024-05-31 ENCOUNTER — APPOINTMENT (OUTPATIENT)
Facility: HOSPITAL | Age: 64
End: 2024-05-31
Attending: ORTHOPAEDIC SURGERY
Payer: MEDICARE

## 2024-05-31 ENCOUNTER — ANESTHESIA (OUTPATIENT)
Facility: HOSPITAL | Age: 64
End: 2024-05-31
Payer: MEDICARE

## 2024-05-31 VITALS
BODY MASS INDEX: 30.71 KG/M2 | HEART RATE: 78 BPM | WEIGHT: 179.9 LBS | DIASTOLIC BLOOD PRESSURE: 81 MMHG | SYSTOLIC BLOOD PRESSURE: 140 MMHG | TEMPERATURE: 97.9 F | RESPIRATION RATE: 16 BRPM | HEIGHT: 64 IN | OXYGEN SATURATION: 96 %

## 2024-05-31 DIAGNOSIS — M17.12 PRIMARY OSTEOARTHRITIS OF LEFT KNEE: Primary | ICD-10-CM

## 2024-05-31 PROCEDURE — 64447 NJX AA&/STRD FEMORAL NRV IMG: CPT | Performed by: ANESTHESIOLOGY

## 2024-05-31 PROCEDURE — 6360000002 HC RX W HCPCS

## 2024-05-31 PROCEDURE — 73560 X-RAY EXAM OF KNEE 1 OR 2: CPT

## 2024-05-31 PROCEDURE — G0378 HOSPITAL OBSERVATION PER HR: HCPCS

## 2024-05-31 PROCEDURE — 6360000002 HC RX W HCPCS: Performed by: ORTHOPAEDIC SURGERY

## 2024-05-31 PROCEDURE — 6370000000 HC RX 637 (ALT 250 FOR IP): Performed by: ORTHOPAEDIC SURGERY

## 2024-05-31 PROCEDURE — 7100000001 HC PACU RECOVERY - ADDTL 15 MIN: Performed by: ORTHOPAEDIC SURGERY

## 2024-05-31 PROCEDURE — 97116 GAIT TRAINING THERAPY: CPT

## 2024-05-31 PROCEDURE — 2720000010 HC SURG SUPPLY STERILE: Performed by: ORTHOPAEDIC SURGERY

## 2024-05-31 PROCEDURE — 97161 PT EVAL LOW COMPLEX 20 MIN: CPT

## 2024-05-31 PROCEDURE — 2500000003 HC RX 250 WO HCPCS

## 2024-05-31 PROCEDURE — 3600000015 HC SURGERY LEVEL 5 ADDTL 15MIN: Performed by: ORTHOPAEDIC SURGERY

## 2024-05-31 PROCEDURE — 2709999900 HC NON-CHARGEABLE SUPPLY: Performed by: ORTHOPAEDIC SURGERY

## 2024-05-31 PROCEDURE — 3700000000 HC ANESTHESIA ATTENDED CARE: Performed by: ORTHOPAEDIC SURGERY

## 2024-05-31 PROCEDURE — C1776 JOINT DEVICE (IMPLANTABLE): HCPCS | Performed by: ORTHOPAEDIC SURGERY

## 2024-05-31 PROCEDURE — 2500000003 HC RX 250 WO HCPCS: Performed by: ORTHOPAEDIC SURGERY

## 2024-05-31 PROCEDURE — 7100000000 HC PACU RECOVERY - FIRST 15 MIN: Performed by: ORTHOPAEDIC SURGERY

## 2024-05-31 PROCEDURE — 2580000003 HC RX 258: Performed by: ORTHOPAEDIC SURGERY

## 2024-05-31 PROCEDURE — 3600000005 HC SURGERY LEVEL 5 BASE: Performed by: ORTHOPAEDIC SURGERY

## 2024-05-31 PROCEDURE — 3700000001 HC ADD 15 MINUTES (ANESTHESIA): Performed by: ORTHOPAEDIC SURGERY

## 2024-05-31 PROCEDURE — 6360000002 HC RX W HCPCS: Performed by: ANESTHESIOLOGY

## 2024-05-31 PROCEDURE — A4216 STERILE WATER/SALINE, 10 ML: HCPCS | Performed by: ORTHOPAEDIC SURGERY

## 2024-05-31 PROCEDURE — 2580000003 HC RX 258: Performed by: STUDENT IN AN ORGANIZED HEALTH CARE EDUCATION/TRAINING PROGRAM

## 2024-05-31 PROCEDURE — 2500000003 HC RX 250 WO HCPCS: Performed by: ANESTHESIOLOGY

## 2024-05-31 PROCEDURE — 2580000003 HC RX 258

## 2024-05-31 PROCEDURE — 6370000000 HC RX 637 (ALT 250 FOR IP): Performed by: ANESTHESIOLOGY

## 2024-05-31 DEVICE — CRUCIATE RETAINING FEMORAL
Type: IMPLANTABLE DEVICE | Site: KNEE | Status: FUNCTIONAL
Brand: TRIATHLON

## 2024-05-31 DEVICE — PATELLA
Type: IMPLANTABLE DEVICE | Site: KNEE | Status: FUNCTIONAL
Brand: TRIATHLON

## 2024-05-31 DEVICE — COMPONENT TOT KNEE CAPPED PRIMARY K2STRYKER] STRYKER CORP]: Type: IMPLANTABLE DEVICE | Site: KNEE | Status: FUNCTIONAL

## 2024-05-31 DEVICE — TIBIAL COMPONENT
Type: IMPLANTABLE DEVICE | Site: KNEE | Status: FUNCTIONAL
Brand: TRIATHLON

## 2024-05-31 DEVICE — TIBIAL BEARING INSERT - CS
Type: IMPLANTABLE DEVICE | Site: KNEE | Status: FUNCTIONAL
Brand: TRIATHLON

## 2024-05-31 RX ORDER — ONDANSETRON 4 MG/1
4 TABLET, ORALLY DISINTEGRATING ORAL EVERY 8 HOURS PRN
Status: DISCONTINUED | OUTPATIENT
Start: 2024-05-31 | End: 2024-05-31 | Stop reason: HOSPADM

## 2024-05-31 RX ORDER — SODIUM CHLORIDE 0.9 % (FLUSH) 0.9 %
5-40 SYRINGE (ML) INJECTION EVERY 12 HOURS SCHEDULED
Status: DISCONTINUED | OUTPATIENT
Start: 2024-05-31 | End: 2024-05-31 | Stop reason: HOSPADM

## 2024-05-31 RX ORDER — ACETAMINOPHEN 500 MG
1000 TABLET ORAL ONCE
Status: DISCONTINUED | OUTPATIENT
Start: 2024-05-31 | End: 2024-05-31 | Stop reason: HOSPADM

## 2024-05-31 RX ORDER — MORPHINE SULFATE 4 MG/ML
2 INJECTION, SOLUTION INTRAMUSCULAR; INTRAVENOUS
Status: DISCONTINUED | OUTPATIENT
Start: 2024-05-31 | End: 2024-05-31 | Stop reason: HOSPADM

## 2024-05-31 RX ORDER — SODIUM CHLORIDE, SODIUM LACTATE, POTASSIUM CHLORIDE, CALCIUM CHLORIDE 600; 310; 30; 20 MG/100ML; MG/100ML; MG/100ML; MG/100ML
INJECTION, SOLUTION INTRAVENOUS CONTINUOUS
Status: DISCONTINUED | OUTPATIENT
Start: 2024-05-31 | End: 2024-05-31 | Stop reason: HOSPADM

## 2024-05-31 RX ORDER — HYDROMORPHONE HYDROCHLORIDE 2 MG/ML
INJECTION, SOLUTION INTRAMUSCULAR; INTRAVENOUS; SUBCUTANEOUS PRN
Status: DISCONTINUED | OUTPATIENT
Start: 2024-05-31 | End: 2024-05-31 | Stop reason: SDUPTHER

## 2024-05-31 RX ORDER — ONDANSETRON 2 MG/ML
4 INJECTION INTRAMUSCULAR; INTRAVENOUS
Status: DISCONTINUED | OUTPATIENT
Start: 2024-05-31 | End: 2024-05-31 | Stop reason: HOSPADM

## 2024-05-31 RX ORDER — CELECOXIB 100 MG/1
100 CAPSULE ORAL 2 TIMES DAILY
Status: DISCONTINUED | OUTPATIENT
Start: 2024-06-01 | End: 2024-05-31 | Stop reason: HOSPADM

## 2024-05-31 RX ORDER — BUPIVACAINE HYDROCHLORIDE 2.5 MG/ML
INJECTION, SOLUTION EPIDURAL; INFILTRATION; INTRACAUDAL PRN
Status: DISCONTINUED | OUTPATIENT
Start: 2024-05-31 | End: 2024-05-31 | Stop reason: SDUPTHER

## 2024-05-31 RX ORDER — PROCHLORPERAZINE EDISYLATE 5 MG/ML
5 INJECTION INTRAMUSCULAR; INTRAVENOUS
Status: DISCONTINUED | OUTPATIENT
Start: 2024-05-31 | End: 2024-05-31 | Stop reason: HOSPADM

## 2024-05-31 RX ORDER — NALOXONE HYDROCHLORIDE 0.4 MG/ML
INJECTION, SOLUTION INTRAMUSCULAR; INTRAVENOUS; SUBCUTANEOUS PRN
Status: DISCONTINUED | OUTPATIENT
Start: 2024-05-31 | End: 2024-05-31 | Stop reason: HOSPADM

## 2024-05-31 RX ORDER — ONDANSETRON 2 MG/ML
4 INJECTION INTRAMUSCULAR; INTRAVENOUS EVERY 6 HOURS PRN
Status: DISCONTINUED | OUTPATIENT
Start: 2024-05-31 | End: 2024-05-31 | Stop reason: HOSPADM

## 2024-05-31 RX ORDER — OXYCODONE HYDROCHLORIDE 5 MG/1
5 TABLET ORAL EVERY 4 HOURS PRN
Status: DISCONTINUED | OUTPATIENT
Start: 2024-05-31 | End: 2024-05-31 | Stop reason: HOSPADM

## 2024-05-31 RX ORDER — DEXAMETHASONE SODIUM PHOSPHATE 4 MG/ML
INJECTION, SOLUTION INTRA-ARTICULAR; INTRALESIONAL; INTRAMUSCULAR; INTRAVENOUS; SOFT TISSUE PRN
Status: DISCONTINUED | OUTPATIENT
Start: 2024-05-31 | End: 2024-05-31 | Stop reason: SDUPTHER

## 2024-05-31 RX ORDER — TRANEXAMIC ACID 100 MG/ML
INJECTION, SOLUTION INTRAVENOUS PRN
Status: DISCONTINUED | OUTPATIENT
Start: 2024-05-31 | End: 2024-05-31 | Stop reason: SDUPTHER

## 2024-05-31 RX ORDER — SENNA AND DOCUSATE SODIUM 50; 8.6 MG/1; MG/1
1 TABLET, FILM COATED ORAL 2 TIMES DAILY
Status: DISCONTINUED | OUTPATIENT
Start: 2024-05-31 | End: 2024-05-31 | Stop reason: HOSPADM

## 2024-05-31 RX ORDER — ACETAMINOPHEN 650 MG
TABLET, EXTENDED RELEASE ORAL PRN
Status: DISCONTINUED | OUTPATIENT
Start: 2024-05-31 | End: 2024-05-31 | Stop reason: ALTCHOICE

## 2024-05-31 RX ORDER — CELECOXIB 200 MG/1
200 CAPSULE ORAL ONCE
Status: COMPLETED | OUTPATIENT
Start: 2024-05-31 | End: 2024-05-31

## 2024-05-31 RX ORDER — ACETAMINOPHEN 500 MG
1000 TABLET ORAL ONCE
Status: COMPLETED | OUTPATIENT
Start: 2024-05-31 | End: 2024-05-31

## 2024-05-31 RX ORDER — SODIUM CHLORIDE 0.9 % (FLUSH) 0.9 %
5-40 SYRINGE (ML) INJECTION PRN
Status: DISCONTINUED | OUTPATIENT
Start: 2024-05-31 | End: 2024-05-31 | Stop reason: HOSPADM

## 2024-05-31 RX ORDER — SODIUM CHLORIDE 9 MG/ML
INJECTION, SOLUTION INTRAVENOUS PRN
Status: DISCONTINUED | OUTPATIENT
Start: 2024-05-31 | End: 2024-05-31 | Stop reason: HOSPADM

## 2024-05-31 RX ORDER — POLYETHYLENE GLYCOL 3350 17 G/17G
17 POWDER, FOR SOLUTION ORAL DAILY
Status: DISCONTINUED | OUTPATIENT
Start: 2024-05-31 | End: 2024-05-31 | Stop reason: HOSPADM

## 2024-05-31 RX ORDER — OXYCODONE HYDROCHLORIDE 5 MG/1
10 TABLET ORAL EVERY 4 HOURS PRN
Status: DISCONTINUED | OUTPATIENT
Start: 2024-05-31 | End: 2024-05-31 | Stop reason: HOSPADM

## 2024-05-31 RX ORDER — DEXAMETHASONE SODIUM PHOSPHATE 10 MG/ML
8 INJECTION, SOLUTION INTRAMUSCULAR; INTRAVENOUS ONCE
Status: DISCONTINUED | OUTPATIENT
Start: 2024-05-31 | End: 2024-05-31 | Stop reason: HOSPADM

## 2024-05-31 RX ORDER — 0.9 % SODIUM CHLORIDE 0.9 %
500 INTRAVENOUS SOLUTION INTRAVENOUS ONCE AS NEEDED
Status: DISCONTINUED | OUTPATIENT
Start: 2024-05-31 | End: 2024-05-31 | Stop reason: HOSPADM

## 2024-05-31 RX ORDER — DIPHENHYDRAMINE HYDROCHLORIDE 50 MG/ML
25 INJECTION INTRAMUSCULAR; INTRAVENOUS EVERY 6 HOURS PRN
Status: DISCONTINUED | OUTPATIENT
Start: 2024-05-31 | End: 2024-05-31 | Stop reason: HOSPADM

## 2024-05-31 RX ORDER — OXYCODONE HYDROCHLORIDE 5 MG/1
5 TABLET ORAL EVERY 4 HOURS PRN
Qty: 28 TABLET | Refills: 0 | Status: SHIPPED | OUTPATIENT
Start: 2024-05-31 | End: 2024-06-07

## 2024-05-31 RX ORDER — SODIUM CHLORIDE, SODIUM LACTATE, POTASSIUM CHLORIDE, CALCIUM CHLORIDE 600; 310; 30; 20 MG/100ML; MG/100ML; MG/100ML; MG/100ML
INJECTION, SOLUTION INTRAVENOUS CONTINUOUS PRN
Status: DISCONTINUED | OUTPATIENT
Start: 2024-05-31 | End: 2024-05-31 | Stop reason: SDUPTHER

## 2024-05-31 RX ORDER — HYDROMORPHONE HYDROCHLORIDE 1 MG/ML
0.5 INJECTION, SOLUTION INTRAMUSCULAR; INTRAVENOUS; SUBCUTANEOUS EVERY 5 MIN PRN
Status: COMPLETED | OUTPATIENT
Start: 2024-05-31 | End: 2024-05-31

## 2024-05-31 RX ORDER — ASPIRIN 81 MG/1
81 TABLET ORAL 2 TIMES DAILY
Status: DISCONTINUED | OUTPATIENT
Start: 2024-05-31 | End: 2024-05-31 | Stop reason: HOSPADM

## 2024-05-31 RX ORDER — PROPOFOL 10 MG/ML
INJECTION, EMULSION INTRAVENOUS CONTINUOUS PRN
Status: DISCONTINUED | OUTPATIENT
Start: 2024-05-31 | End: 2024-05-31 | Stop reason: SDUPTHER

## 2024-05-31 RX ORDER — BISACODYL 10 MG
10 SUPPOSITORY, RECTAL RECTAL DAILY PRN
Status: DISCONTINUED | OUTPATIENT
Start: 2024-05-31 | End: 2024-05-31 | Stop reason: HOSPADM

## 2024-05-31 RX ORDER — LIDOCAINE HYDROCHLORIDE 10 MG/ML
1 INJECTION, SOLUTION EPIDURAL; INFILTRATION; INTRACAUDAL; PERINEURAL
Status: DISCONTINUED | OUTPATIENT
Start: 2024-05-31 | End: 2024-05-31 | Stop reason: HOSPADM

## 2024-05-31 RX ORDER — DIPHENHYDRAMINE HCL 25 MG
25 CAPSULE ORAL EVERY 6 HOURS PRN
Status: DISCONTINUED | OUTPATIENT
Start: 2024-05-31 | End: 2024-05-31 | Stop reason: HOSPADM

## 2024-05-31 RX ORDER — OXYCODONE HYDROCHLORIDE 5 MG/1
5 TABLET ORAL
Status: COMPLETED | OUTPATIENT
Start: 2024-05-31 | End: 2024-05-31

## 2024-05-31 RX ORDER — TRANEXAMIC ACID 100 MG/ML
INJECTION, SOLUTION INTRAVENOUS PRN
Status: DISCONTINUED | OUTPATIENT
Start: 2024-05-31 | End: 2024-05-31 | Stop reason: ALTCHOICE

## 2024-05-31 RX ORDER — SODIUM CHLORIDE 9 MG/ML
INJECTION, SOLUTION INTRAVENOUS CONTINUOUS
Status: DISCONTINUED | OUTPATIENT
Start: 2024-05-31 | End: 2024-05-31 | Stop reason: HOSPADM

## 2024-05-31 RX ORDER — MIDAZOLAM HYDROCHLORIDE 1 MG/ML
INJECTION INTRAMUSCULAR; INTRAVENOUS PRN
Status: DISCONTINUED | OUTPATIENT
Start: 2024-05-31 | End: 2024-05-31 | Stop reason: SDUPTHER

## 2024-05-31 RX ORDER — ACETAMINOPHEN 500 MG
1000 TABLET ORAL EVERY 8 HOURS SCHEDULED
Status: DISCONTINUED | OUTPATIENT
Start: 2024-05-31 | End: 2024-05-31 | Stop reason: HOSPADM

## 2024-05-31 RX ORDER — FAMOTIDINE 20 MG/1
20 TABLET, FILM COATED ORAL 2 TIMES DAILY
Status: DISCONTINUED | OUTPATIENT
Start: 2024-05-31 | End: 2024-05-31 | Stop reason: HOSPADM

## 2024-05-31 RX ORDER — ONDANSETRON 2 MG/ML
INJECTION INTRAMUSCULAR; INTRAVENOUS PRN
Status: DISCONTINUED | OUTPATIENT
Start: 2024-05-31 | End: 2024-05-31 | Stop reason: SDUPTHER

## 2024-05-31 RX ORDER — ROPIVACAINE HYDROCHLORIDE 5 MG/ML
INJECTION, SOLUTION EPIDURAL; INFILTRATION; PERINEURAL PRN
Status: DISCONTINUED | OUTPATIENT
Start: 2024-05-31 | End: 2024-05-31 | Stop reason: ALTCHOICE

## 2024-05-31 RX ORDER — LAMOTRIGINE 100 MG/1
100 TABLET ORAL NIGHTLY
Status: DISCONTINUED | OUTPATIENT
Start: 2024-05-31 | End: 2024-05-31 | Stop reason: HOSPADM

## 2024-05-31 RX ORDER — ASPIRIN 81 MG/1
81 TABLET ORAL 2 TIMES DAILY
Qty: 30 TABLET | Refills: 3 | Status: SHIPPED | OUTPATIENT
Start: 2024-05-31

## 2024-05-31 RX ORDER — FENTANYL CITRATE 50 UG/ML
25 INJECTION, SOLUTION INTRAMUSCULAR; INTRAVENOUS EVERY 5 MIN PRN
Status: COMPLETED | OUTPATIENT
Start: 2024-05-31 | End: 2024-05-31

## 2024-05-31 RX ORDER — ROPINIROLE 1 MG/1
3 TABLET, FILM COATED ORAL NIGHTLY
Status: DISCONTINUED | OUTPATIENT
Start: 2024-05-31 | End: 2024-05-31 | Stop reason: HOSPADM

## 2024-05-31 RX ORDER — TRANEXAMIC ACID 650 MG/1
1950 TABLET ORAL
Status: DISCONTINUED | OUTPATIENT
Start: 2024-06-01 | End: 2024-05-31 | Stop reason: HOSPADM

## 2024-05-31 RX ORDER — KETOROLAC TROMETHAMINE 30 MG/ML
15 INJECTION, SOLUTION INTRAMUSCULAR; INTRAVENOUS EVERY 6 HOURS
Status: DISCONTINUED | OUTPATIENT
Start: 2024-05-31 | End: 2024-05-31 | Stop reason: HOSPADM

## 2024-05-31 RX ADMIN — WATER 2000 MG: 1 INJECTION INTRAMUSCULAR; INTRAVENOUS; SUBCUTANEOUS at 16:38

## 2024-05-31 RX ADMIN — PROPOFOL 80 MCG/KG/MIN: 10 INJECTION, EMULSION INTRAVENOUS at 08:54

## 2024-05-31 RX ADMIN — Medication 3 AMPULE: at 07:03

## 2024-05-31 RX ADMIN — SODIUM CHLORIDE, POTASSIUM CHLORIDE, SODIUM LACTATE AND CALCIUM CHLORIDE: 600; 310; 30; 20 INJECTION, SOLUTION INTRAVENOUS at 07:02

## 2024-05-31 RX ADMIN — ASPIRIN 81 MG: 81 TABLET, COATED ORAL at 16:38

## 2024-05-31 RX ADMIN — WATER 2000 MG: 1 INJECTION INTRAMUSCULAR; INTRAVENOUS; SUBCUTANEOUS at 09:03

## 2024-05-31 RX ADMIN — TRANEXAMIC ACID 1000 MG: 100 INJECTION, SOLUTION INTRAVENOUS at 09:05

## 2024-05-31 RX ADMIN — OXYCODONE HYDROCHLORIDE 5 MG: 5 TABLET ORAL at 11:25

## 2024-05-31 RX ADMIN — FENTANYL CITRATE 25 MCG: 50 INJECTION INTRAMUSCULAR; INTRAVENOUS at 11:35

## 2024-05-31 RX ADMIN — FENTANYL CITRATE 25 MCG: 50 INJECTION INTRAMUSCULAR; INTRAVENOUS at 11:28

## 2024-05-31 RX ADMIN — HYDROMORPHONE HYDROCHLORIDE 0.5 MG: 1 INJECTION, SOLUTION INTRAMUSCULAR; INTRAVENOUS; SUBCUTANEOUS at 12:15

## 2024-05-31 RX ADMIN — PHENYLEPHRINE HYDROCHLORIDE 30 MCG/MIN: 10 INJECTION INTRAVENOUS at 09:26

## 2024-05-31 RX ADMIN — MEPIVACAINE HYDROCHLORIDE 2.6 ML: 20 INJECTION, SOLUTION EPIDURAL; INFILTRATION at 08:40

## 2024-05-31 RX ADMIN — DEXAMETHASONE SODIUM PHOSPHATE 10 MG: 4 INJECTION, SOLUTION INTRAMUSCULAR; INTRAVENOUS at 09:08

## 2024-05-31 RX ADMIN — FENTANYL CITRATE 25 MCG: 50 INJECTION INTRAMUSCULAR; INTRAVENOUS at 11:55

## 2024-05-31 RX ADMIN — HYDROMORPHONE HYDROCHLORIDE 0.6 MG: 2 INJECTION INTRAMUSCULAR; INTRAVENOUS; SUBCUTANEOUS at 09:58

## 2024-05-31 RX ADMIN — FAMOTIDINE 20 MG: 10 INJECTION, SOLUTION INTRAVENOUS at 14:27

## 2024-05-31 RX ADMIN — KETOROLAC TROMETHAMINE 15 MG: 30 INJECTION, SOLUTION INTRAMUSCULAR at 13:44

## 2024-05-31 RX ADMIN — FENTANYL CITRATE 25 MCG: 50 INJECTION INTRAMUSCULAR; INTRAVENOUS at 11:50

## 2024-05-31 RX ADMIN — BUPIVACAINE HYDROCHLORIDE 25 ML: 2.5 INJECTION, SOLUTION EPIDURAL; INFILTRATION; INTRACAUDAL at 08:43

## 2024-05-31 RX ADMIN — MIDAZOLAM HYDROCHLORIDE 2 MG: 1 INJECTION, SOLUTION INTRAMUSCULAR; INTRAVENOUS at 08:37

## 2024-05-31 RX ADMIN — TRANEXAMIC ACID 1000 MG: 100 INJECTION, SOLUTION INTRAVENOUS at 09:54

## 2024-05-31 RX ADMIN — ONDANSETRON HYDROCHLORIDE 4 MG: 2 INJECTION, SOLUTION INTRAMUSCULAR; INTRAVENOUS at 09:07

## 2024-05-31 RX ADMIN — OXYCODONE HYDROCHLORIDE 10 MG: 5 TABLET ORAL at 14:42

## 2024-05-31 RX ADMIN — ACETAMINOPHEN 1000 MG: 500 TABLET ORAL at 06:50

## 2024-05-31 RX ADMIN — CELECOXIB 200 MG: 200 CAPSULE ORAL at 06:49

## 2024-05-31 RX ADMIN — HYDROMORPHONE HYDROCHLORIDE 0.5 MG: 1 INJECTION, SOLUTION INTRAMUSCULAR; INTRAVENOUS; SUBCUTANEOUS at 12:27

## 2024-05-31 RX ADMIN — SODIUM CHLORIDE, POTASSIUM CHLORIDE, SODIUM LACTATE AND CALCIUM CHLORIDE: 600; 310; 30; 20 INJECTION, SOLUTION INTRAVENOUS at 08:48

## 2024-05-31 RX ADMIN — ACETAMINOPHEN 1000 MG: 500 TABLET ORAL at 13:43

## 2024-05-31 ASSESSMENT — PAIN DESCRIPTION - ORIENTATION
ORIENTATION: LEFT

## 2024-05-31 ASSESSMENT — PAIN SCALES - GENERAL
PAINLEVEL_OUTOF10: 5
PAINLEVEL_OUTOF10: 0
PAINLEVEL_OUTOF10: 5
PAINLEVEL_OUTOF10: 0
PAINLEVEL_OUTOF10: 5
PAINLEVEL_OUTOF10: 4
PAINLEVEL_OUTOF10: 5
PAINLEVEL_OUTOF10: 0
PAINLEVEL_OUTOF10: 3

## 2024-05-31 ASSESSMENT — PAIN DESCRIPTION - LOCATION
LOCATION: KNEE

## 2024-05-31 ASSESSMENT — PAIN DESCRIPTION - DESCRIPTORS
DESCRIPTORS: SORE
DESCRIPTORS: ACHING;SORE
DESCRIPTORS: ACHING
DESCRIPTORS: ACHING;SORE
DESCRIPTORS: ACHING
DESCRIPTORS: BURNING
DESCRIPTORS: SORE

## 2024-05-31 ASSESSMENT — PAIN DESCRIPTION - PAIN TYPE: TYPE: SURGICAL PAIN

## 2024-05-31 NOTE — PLAN OF CARE
Problem: Safety - Adult  Goal: Free from fall injury  5/31/2024 1701 by Marie Dill RN  Outcome: Adequate for Discharge  5/31/2024 1700 by Marie Dill RN  Outcome: Adequate for Discharge     Problem: ABCDS Injury Assessment  Goal: Absence of physical injury  5/31/2024 1701 by Marie Dill RN  Outcome: Adequate for Discharge  5/31/2024 1700 by Marie Dill RN  Outcome: Adequate for Discharge     Problem: Pain  Goal: Verbalizes/displays adequate comfort level or baseline comfort level  5/31/2024 1701 by Marie Dill RN  Outcome: Adequate for Discharge  5/31/2024 1700 by Marie Dill RN  Outcome: Adequate for Discharge     Problem: Physical Therapy - Adult  Goal: By Discharge: Performs mobility at highest level of function for planned discharge setting.  See evaluation for individualized goals.  Description: FUNCTIONAL STATUS PRIOR TO ADMISSION: Patient was independent and active without use of DME.    HOME SUPPORT PRIOR TO ADMISSION: The patient lived alone with neice to provide assistance.    Physical Therapy Goals  Initiated 5/31/2024  1.  Patient will move from supine to sit and sit to supine, scoot up and down, and roll side to side in bed with independence within 4 day(s).    2.  Patient will perform sit to stand with modified independence within 4 day(s).  3.  Patient will transfer from bed to chair and chair to bed with modified independence using the least restrictive device within 4 day(s).  4.  Patient will ambulate with modified independence for 150 feet with the least restrictive device within 4 day(s).   5.  Patient will ascend/descend 4 stairs with handrail(s) with contact guard assist within 4 day(s).  6. Patient will perform home exercise program per protocol with modified independence within 4 days.  7. Patient will demonstrate AROM 0-90 degrees in operative joint within 4 days.     5/31/2024 1623 by Mary Kay De La Torre PT  Outcome: Adequate for Discharge

## 2024-05-31 NOTE — OP NOTE
technique.      At this point the patient's bone quality was inspected and felt to be excellent. With good bone quality and a well balanced knee, the decision was made to proceed with press fit fixation of both components. Femoral lug holes were drilled. The tibia was punched. The final implants were then impacted into position with excellent bony contact. The final polyethylene liner was locked into the tibial tray and confirmed to be engaged in the locking mechanism.     The tourniquet was then let down and the knee was irrigated. Hemostasis was achieved. The arthrotomy was closed with #1 StrataFix. The wound was then closed in layers with #2 Vicryl, Stratafix, #2-0 Monocryl, running #3-0 Monocryl, Dermabond, and sterile dressings.     The patient was awakened from anesthesia and transferred to the recovery room in stable condition. There were no apparent intraoperative complications. Counts were correct.    DISPOSITION: Stable to PACU. Radiographs to be taken there.    POSTOPERATIVE PLAN: The plan will be to admit the patient to obs patient status. There they will undergo physical therapy and will mobilize as able. Pain will be controlled with oral and IV medications. DVT prophylaxis will be stratified based on risk factors. After the patient has mobilized appropriately, they will be discharged, with the plan for outpatient physical therapy to continue on a regular basis.     FOLLOW UP: We will plan to see the patient back in clinic approximately 2 weeks after surgery for a wound check and follow up on clinical progress.     Moe Keith MD

## 2024-05-31 NOTE — CARE COORDINATION
5751 - Met with pt and support person at bedside to inform that [facility] is only accepting home health so far, pt verbalized understanding and voiced preference to proceed with [facility] rather than attempt additional referrals. CM validated this preference, will update AVS with contact information for [facility].    Initial note - CM acknowledged receipt of consult to coordinate home health physical therapy. Chart review completed, pt has had surgery, therapy evaluations are pending. Pt's [employer] Medicare may be limiting factor to HH services, CM sending multiple referrals to determine insurance coverage and staffing availability, will obtain pt's preference between accepting agencies.    Nicole Sexton, [facility]  Care Management  [phone]

## 2024-05-31 NOTE — H&P
roll on the affected side.      right Knee EXAM  INCISION- well healed, no erythema or drainage  APPEARANCE- no swelling, no effusion  STABILITY-  No significant anterior/posterior or varus/valgus laxity noted throughout range of motion  ROM-  PROM is 0 to 125  STRENGTH/FUNCTION- Normal/expected strength with flexion and extension   PERFUSION/SENSATION- Good distal pulses, sensation and capillary refill on the lower extremity. Negative Homans sign.        IMAGING / STUDIES   Order: XR KNEE 3 VW RIGHT - Indication: S/P total knee replacement, right  Order: XR KNEE 4+ VW LEFT - Indication: Primary osteoarthritis of left   knee      X-ray knee left 4+ views     Result Date: 3/26/2024  Weight Bearing. Views: Standing AP, Lat, [place], [place].      Impression: Today's exam shows evidence of Moderate to severe joint space narrowing, osteophyte formation, and subchondral sclerosis, all consistent with degenerative arthritis of the knee. There is mild varus deformity. No other significant findings are noted.      X-ray knee right 3 views ([id number])     Result Date: 3/26/2024  Shielding: N/A. Weight Bearing. Views: Standing AP, Lat, [place].      Impression: X-rays demonstrate good alignment of the total knee replacement without evidence of wear or loosening. No appreciable fractures.          ASSESSMENT  1. Primary osteoarthritis of left knee    2. S/P total knee replacement, right    3. Acquired deformity of left knee          Impression: 63 y.o. female with left knee pain with Moderate to severe osteoarthritis        PLAN  We reviewed the patient's imaging, diagnosis, and pathology in detail. Ultimately they have symptomatic degenerative joint disease, management for which depends on the degree of symptomatolgy. We talked about treatment options.  We discussed a comprehensive non-operative protocol including activity modification, bracing/compression, ice or heat, acetaminophen, NSAIDs, gait aids, low impact exercise such

## 2024-05-31 NOTE — PLAN OF CARE
Problem: Physical Therapy - Adult  Goal: By Discharge: Performs mobility at highest level of function for planned discharge setting.  See evaluation for individualized goals.  Description: FUNCTIONAL STATUS PRIOR TO ADMISSION: Patient was independent and active without use of DME.    HOME SUPPORT PRIOR TO ADMISSION: The patient lived alone with neice to provide assistance.    Physical Therapy Goals  Initiated 5/31/2024  1.  Patient will move from supine to sit and sit to supine, scoot up and down, and roll side to side in bed with independence within 4 day(s).    2.  Patient will perform sit to stand with modified independence within 4 day(s).  3.  Patient will transfer from bed to chair and chair to bed with modified independence using the least restrictive device within 4 day(s).  4.  Patient will ambulate with modified independence for 150 feet with the least restrictive device within 4 day(s).   5.  Patient will ascend/descend 4 stairs with handrail(s) with contact guard assist within 4 day(s).  6. Patient will perform home exercise program per protocol with modified independence within 4 days.  7. Patient will demonstrate AROM 0-90 degrees in operative joint within 4 days.     Outcome: Adequate for Discharge   PHYSICAL THERAPY EVALUATION    Patient: Tiffany Watson (63 y.o. female)  Date: 5/31/2024  Primary Diagnosis: Osteoarthritis of left knee, unspecified osteoarthritis type [M17.12]  Primary osteoarthritis of left knee [M17.12]  Procedure(s) (LRB):  LEFT TOTAL KNEE ARTHROPLASTY WITH MAKOPLASTY (Left) Day of Surgery   Precautions: Restrictions/Precautions: Weight Bearing   Lower Extremity Weight Bearing Restrictions  Left Lower Extremity Weight Bearing: Weight Bearing As Tolerated                ASSESSMENT :   DEFICITS/IMPAIRMENTS:   The patient is limited by decreased functional mobility, ROM, strength, balance, increased pain levels following admission POD #0 L TKA.     Based on the impairments listed above

## 2024-05-31 NOTE — PROGRESS NOTES
Patient has RW for home use.  Daughter at bedside.    Discussed the importance of using pain medication and other methods for pain control such as ice/rest/elevate, pre-medicating prior to physical therapy.  Discussed the importance of being safe at hospital and home by using RW until cleared by PT, wearing safe shoes, preparing home for after surgery and having a  to help at home.  Discussed the importance of home PT, daily exercises as instructed by physical therapist and post-op appointment with surgeon and the need for transportation to this appointment until the surgeon has cleared you for driving.    Discussed risk after surgery and the importance of preventing infection by good hand hygiene, using clean towels and wash cloths at each shower, inspect wound/dressing daily, moving every two hours while awake, using the incentive spirometer every hour while awake.  When to call the doctor for help, or when to call 911 for emergency.    Opportunity given for patient/family to ask additional questions about any special concerns regarding your care while on the Ortho unit and preparing for discharge.

## 2024-05-31 NOTE — ANESTHESIA POSTPROCEDURE EVALUATION
Department of Anesthesiology  Postprocedure Note    Patient: Tiffany Watson  MRN: [id number]  YOB: 1960  Date of evaluation: 5/31/2024    Procedure Summary       Date: 05/31/24 Room / Location: [facility] MAIN OR  / [facility] MAIN OR    Anesthesia Start: 0848 Anesthesia Stop: 1027    Procedure: LEFT TOTAL KNEE ARTHROPLASTY WITH MAKOPLASTY (Left: Knee) Diagnosis:       Osteoarthritis of left knee, unspecified osteoarthritis type      (Osteoarthritis of left knee, unspecified osteoarthritis type [M17.12])    Providers: Moe Keith MD Responsible Provider: Otoniel Anthony MD    Anesthesia Type: spinal ASA Status: 2            Anesthesia Type: No value filed.    Edson Phase I: Edson Score: 8    Edson Phase II:      Anesthesia Post Evaluation    Patient location during evaluation: PACU  Patient participation: complete - patient participated  Level of consciousness: awake  Pain score: 0  Airway patency: patent  Nausea & Vomiting: no nausea and no vomiting  Cardiovascular status: hemodynamically stable  Respiratory status: acceptable  Hydration status: stable  Multimodal analgesia pain management approach  Pain management: adequate    No notable events documented.

## 2024-05-31 NOTE — PLAN OF CARE
Problem: Safety - Adult  Goal: Free from fall injury  Outcome: Adequate for Discharge     Problem: ABCDS Injury Assessment  Goal: Absence of physical injury  Outcome: Adequate for Discharge     Problem: Pain  Goal: Verbalizes/displays adequate comfort level or baseline comfort level  Outcome: Adequate for Discharge     Problem: Physical Therapy - Adult  Goal: By Discharge: Performs mobility at highest level of function for planned discharge setting.  See evaluation for individualized goals.  Description: FUNCTIONAL STATUS PRIOR TO ADMISSION: Patient was independent and active without use of DME.    HOME SUPPORT PRIOR TO ADMISSION: The patient lived alone with neice to provide assistance.    Physical Therapy Goals  Initiated 5/31/2024  1.  Patient will move from supine to sit and sit to supine, scoot up and down, and roll side to side in bed with independence within 4 day(s).    2.  Patient will perform sit to stand with modified independence within 4 day(s).  3.  Patient will transfer from bed to chair and chair to bed with modified independence using the least restrictive device within 4 day(s).  4.  Patient will ambulate with modified independence for 150 feet with the least restrictive device within 4 day(s).   5.  Patient will ascend/descend 4 stairs with handrail(s) with contact guard assist within 4 day(s).  6. Patient will perform home exercise program per protocol with modified independence within 4 days.  7. Patient will demonstrate AROM 0-90 degrees in operative joint within 4 days.     5/31/2024 1623 by Mary Kay De La Torre PT  Outcome: Adequate for Discharge

## 2024-05-31 NOTE — DISCHARGE INSTRUCTIONS
Discharge Instructions:  Tiffany Watson    Surgery: TOTAL KNEE REPLACEMENT.        To relieve pain:  Use ice/gel packs.    -Put the ice pack directly over the wound, or anywhere you are hurting or swollen.   -To control pain and swelling, keep ice on regularly, especially after physical activity.  -The packs should stay cold for 3-4 hours.  When it is not cold anymore, rotate with the packs in the freezer.      Elevate your leg.  This will also keep swelling down.    Rest for at least 20 minutes between activity or exercises.    To keep track of your pain medications, write down what you take and when you take it.    The last dose of pain medication you got in the hospital was:     Medication    Dose    Date & Time      Choose your medications based on the pain scale below:    To keep your pain under control, take Tylenol every 6 hours for 14 days - even if you feel like you don’t need it.     For mild to moderate pain (4-6 on pain scale), take one pain pill every 4 hours or as instructed.     For severe pain (7-10 on pain scale), take two pain pills every 4 hours or as instructed.     To prevent nausea, take your pain medications with food.                                            Pain Scale              As your pain lessens:    Slowly start taking less pain medication. You may do this by waiting longer between doses or by taking smaller doses.    Stop using the pain medications as soon as you no longer need it, usually in 2-3 weeks.        Aspirin  To prevent blood clots, you will need to take Aspirin 81 mg twice a day for 30 days.              To prevent stomach upset or bleeding:  Take Pepcid 20 mg twice a day, or a similar home medication, while you are taking a blood thinner.         Keep your waterproof dressing in place. It will be removed by your surgeon during your follow-up appointment in 2 weeks.     You may need to change the dressing if you are having drainage to where the dressing is no longer

## 2024-05-31 NOTE — ANESTHESIA PROCEDURE NOTES
Spinal Block    Patient location during procedure: pre-op  End time: 5/31/2024 8:40 AM  Reason for block: primary anesthetic and at surgeon's request  Staffing  Performed: anesthesiologist   Anesthesiologist: Otoniel Anthony MD  Performed by: Otoniel Anthony MD  Authorized by: Otoniel Anthony MD    Spinal Block  Patient position: sitting  Prep: DuraPrep  Patient monitoring: frequent blood pressure checks, oxygen, cardiac monitor, continuous pulse ox and continuous capnometry  Approach: midline  Location: L2/L3  Provider prep: mask and sterile gloves  Needle  Needle type: Pencan   Needle gauge: 25 G  Needle length: 3.5 in  Assessment  Sensory level: T8  Swirl obtained: Yes  CSF: clear  Attempts: 2  Hemodynamics: stable  Additional Notes  1st attempt at L3/L4, unsuccessful  2nd attempt at L2/L3 successful  Preanesthetic Checklist  Completed: patient identified, IV checked, site marked, risks and benefits discussed, surgical/procedural consents, equipment checked, pre-op evaluation, timeout performed, anesthesia consent given, oxygen available, monitors applied/VS acknowledged, fire risk safety assessment completed and verbalized and blood product R/B/A discussed and consented

## 2024-05-31 NOTE — ANESTHESIA PROCEDURE NOTES
Peripheral Block    Patient location during procedure: pre-op  Reason for block: post-op pain management and at surgeon's request  Start time: 5/31/2024 8:42 AM  End time: 5/31/2024 8:45 AM  Staffing  Performed: anesthesiologist   Anesthesiologist: Otoniel Anthony MD  Performed by: Otoniel Anthony MD  Authorized by: Otoniel Anthony MD    Preanesthetic Checklist  Completed: patient identified, IV checked, site marked, risks and benefits discussed, surgical/procedural consents, equipment checked, pre-op evaluation, timeout performed, anesthesia consent given, oxygen available, monitors applied/VS acknowledged and fire risk safety assessment completed and verbalized  Peripheral Block   Patient position: supine  Prep: ChloraPrep  Provider prep: mask and sterile gloves  Patient monitoring: cardiac monitor, continuous pulse ox, continuous capnometry, frequent blood pressure checks and IV access  Block type: Femoral  Adductor canal  Laterality: left  Injection technique: single-shot  Guidance: ultrasound guided    Needle   Needle type: insulated echogenic nerve stimulator needle   Needle gauge: 21 G  Needle localization: anatomical landmarks and ultrasound guidance  Needle length: 10 cm  Assessment   Injection assessment: negative aspiration for heme, no paresthesia on injection, local visualized surrounding nerve on ultrasound and no intravascular symptoms  Paresthesia pain: none  Slow fractionated injection: yes  Hemodynamics: stable  Outcomes: uncomplicated and patient tolerated procedure well    Additional Notes  Excellent visualization on US

## 2024-05-31 NOTE — PROGRESS NOTES
Ortho / Neurosurgery Progress Note    POD# 1  s/p LEFT TOTAL KNEE ARTHROPLASTY WITH MAKOPLASTY   Pt seen with working with PT. No complains, will like to go home. Voided w/o difficulty. Tolerating diet.    Patient in bed    VSS Afebrile.    Visit Vitals  BP (!) 144/88   Pulse 88   Temp 98.3 °F (36.8 °C)   Resp 16   Ht 1.613 m (5' 3.5\")   Wt 81.6 kg (179 lb 14.3 oz)   SpO2 95%   BMI 31.37 kg/m²       Voiding status: Voiding independently.             Labs    Lab Results   Component Value Date/Time    HGB 12.7 05/23/2024 10:10 AM      Lab Results   Component Value Date/Time    INR 1.0 05/23/2024 10:10 AM      Lab Results   Component Value Date/Time     05/28/2024 01:17 PM    K 3.7 05/28/2024 01:17 PM     05/28/2024 01:17 PM    CO2 24 05/28/2024 01:17 PM    BUN 11 05/28/2024 01:17 PM     Recent Glucose Results:   Glucose   Date Value Ref Range Status   05/28/2024 94 65 - 100 mg/dL Final   05/23/2024 90 65 - 100 mg/dL Final   05/09/2024 102 (H) 65 - 100 mg/dL Final           Body mass index is 31.37 kg/m². : A BMI > 30 is classified as obesity and > 40 is classified as morbid obesity.     Awake and alert. No acute distress.    Dressing: Silver Dressing and Ace Wrap C.D.I.   No significant erythema or swelling  Cryotherapy in place over incision.   BLE sensation to light touch intact  BLE motor intact. Strength 5/5    SCD for mechanical DVT proph while in bed        PLAN:  1) PT BID - WBAT.   2) DVT Prophylaxis: Aspirin 81 mg BID for DVT Prophylaxis   3) GI Prophylaxis - PEPCID  4) Pain control - scheduled tylenol  and toradol, and prn  tramadol    5) Readiness for discharge:     [x] Vital Signs stable    [x] Labs stable    [x] + Voiding    [x] Wound intact, drainage minimal    [x] Tolerating PO intake     [] Cleared by PT (OT if applicable) for discharge   [x] Adequate pain control on oral medication alone       Discharge Plan: Discharge Plan: Home with Home Health     Discharge Needs: RW and HH

## 2024-06-07 RX ORDER — SALICYLIC ACID 40 %
ADHESIVE PATCH, MEDICATED TOPICAL
Qty: 180 TABLET | Refills: 1 | Status: SHIPPED | OUTPATIENT
Start: 2024-06-07

## 2024-07-15 ENCOUNTER — OFFICE VISIT (OUTPATIENT)
Age: 64
End: 2024-07-15
Payer: MEDICARE

## 2024-07-15 VITALS
BODY MASS INDEX: 30.26 KG/M2 | RESPIRATION RATE: 16 BRPM | HEIGHT: 63 IN | TEMPERATURE: 97.4 F | HEART RATE: 90 BPM | SYSTOLIC BLOOD PRESSURE: 136 MMHG | WEIGHT: 170.8 LBS | DIASTOLIC BLOOD PRESSURE: 84 MMHG | OXYGEN SATURATION: 98 %

## 2024-07-15 DIAGNOSIS — Z51.81 THERAPEUTIC DRUG MONITORING: ICD-10-CM

## 2024-07-15 DIAGNOSIS — I65.23 BILATERAL CAROTID ARTERY STENOSIS: Primary | ICD-10-CM

## 2024-07-15 DIAGNOSIS — G40.209 COMPLEX PARTIAL SEIZURES EVOLVING TO GENERALIZED TONIC-CLONIC SEIZURES (HCC): ICD-10-CM

## 2024-07-15 DIAGNOSIS — E03.4 HYPOTHYROIDISM DUE TO ACQUIRED ATROPHY OF THYROID: ICD-10-CM

## 2024-07-15 DIAGNOSIS — E55.9 VITAMIN D DEFICIENCY: ICD-10-CM

## 2024-07-15 DIAGNOSIS — I67.89 CEREBRAL MICROVASCULAR DISEASE: ICD-10-CM

## 2024-07-15 DIAGNOSIS — R41.3 DISTURBANCE OF MEMORY: ICD-10-CM

## 2024-07-15 DIAGNOSIS — E53.8 B12 DEFICIENCY: ICD-10-CM

## 2024-07-15 DIAGNOSIS — G25.81 RLS (RESTLESS LEGS SYNDROME): ICD-10-CM

## 2024-07-15 DIAGNOSIS — G45.1 TRANSIENT ISCHEMIC ATTACK INVOLVING LEFT INTERNAL CAROTID ARTERY: ICD-10-CM

## 2024-07-15 LAB
25(OH)D3 SERPL-MCNC: 14 NG/ML (ref 30–100)
FOLATE SERPL-MCNC: 6.3 NG/ML (ref 5–21)
TSH SERPL DL<=0.05 MIU/L-ACNC: 1.78 UIU/ML (ref 0.36–3.74)
VIT B12 SERPL-MCNC: 319 PG/ML (ref 193–986)

## 2024-07-15 PROCEDURE — 99205 OFFICE O/P NEW HI 60 MIN: CPT | Performed by: PSYCHIATRY & NEUROLOGY

## 2024-07-15 PROCEDURE — 1090F PRES/ABSN URINE INCON ASSESS: CPT | Performed by: PSYCHIATRY & NEUROLOGY

## 2024-07-15 PROCEDURE — G8427 DOCREV CUR MEDS BY ELIG CLIN: HCPCS | Performed by: PSYCHIATRY & NEUROLOGY

## 2024-07-15 PROCEDURE — 3017F COLORECTAL CA SCREEN DOC REV: CPT | Performed by: PSYCHIATRY & NEUROLOGY

## 2024-07-15 PROCEDURE — G8417 CALC BMI ABV UP PARAM F/U: HCPCS | Performed by: PSYCHIATRY & NEUROLOGY

## 2024-07-15 PROCEDURE — G8400 PT W/DXA NO RESULTS DOC: HCPCS | Performed by: PSYCHIATRY & NEUROLOGY

## 2024-07-15 PROCEDURE — 1123F ACP DISCUSS/DSCN MKR DOCD: CPT | Performed by: PSYCHIATRY & NEUROLOGY

## 2024-07-15 PROCEDURE — 1036F TOBACCO NON-USER: CPT | Performed by: PSYCHIATRY & NEUROLOGY

## 2024-07-15 RX ORDER — ACETAMINOPHEN 160 MG
TABLET,DISINTEGRATING ORAL
COMMUNITY

## 2024-07-15 ASSESSMENT — PATIENT HEALTH QUESTIONNAIRE - PHQ9
SUM OF ALL RESPONSES TO PHQ QUESTIONS 1-9: 0
2. FEELING DOWN, DEPRESSED OR HOPELESS: NOT AT ALL
SUM OF ALL RESPONSES TO PHQ9 QUESTIONS 1 & 2: 0
1. LITTLE INTEREST OR PLEASURE IN DOING THINGS: NOT AT ALL
SUM OF ALL RESPONSES TO PHQ QUESTIONS 1-9: 0

## 2024-07-15 NOTE — PROGRESS NOTES
Consult  REFERRED BY:  Pedro Cifuentes MD    CHIEF COMPLAINT: Patient with history of seizures and passing out episodes and some memory loss history of TIAs and strokes and amnesia seen as a new patient on urgent work in basis.      Subjective:     Tiffany Watson is a 63 y.o. right-handed call patient female we are seeing as a new patient, at the request of her PCP, for evaluation of patient having spells of amnesia, passing out, with known history of seizures that she has had since 2011 when she was initially diagnosed by neurologist Dr. Mims, and then followed by several other neurologists for several years but has not been seen since 2017.  She is placed on Lamictal 100 mg which she takes just 1 a day, she was previously on 2 a day but did not like the side effects with just takes 1 a day.  In 2022 she had an episode where she lost her balance and fell but does not think she was unconscious and had a workup with a CTA of the neck that was unremarkable and a CT of the head that was normal and CT of the cervical spine that just showed mild arthritis.  She had another episode Tata 2023 when she apparently passed out and was incontinent of stool and urine and was out for about 4 minutes but never really saw a physician then.  She has been fine since that time.  She takes her medications regularly.  Does complain of some difficulty with memory at times and is mildly depressed.  She has never had a stroke or significant head problems.  No cause for her seizures was ever found.  She has restless leg syndrome.  She takes Mirapex 3 mg each night at bedtime.  She does take vitamin B12 we told her to add vitamin D and a multivitamin every day.  She does complain of some mild memory loss and does seem very anxious.  She has significant degenerative arthritis and just had recent knee surgery.  In the past she had been thought to have mild cognitive impairment.  She feels that she may have some amnesia or cognitive

## 2024-07-16 LAB — LAMOTRIGINE SERPL-MCNC: <1 UG/ML (ref 2–20)

## 2024-08-04 ENCOUNTER — HOSPITAL ENCOUNTER (OUTPATIENT)
Facility: HOSPITAL | Age: 64
Discharge: HOME | End: 2024-08-07
Attending: PSYCHIATRY & NEUROLOGY
Payer: MEDICARE

## 2024-08-04 DIAGNOSIS — G40.209 COMPLEX PARTIAL SEIZURES EVOLVING TO GENERALIZED TONIC-CLONIC SEIZURES (HCC): ICD-10-CM

## 2024-08-04 DIAGNOSIS — G45.1 TRANSIENT ISCHEMIC ATTACK INVOLVING LEFT INTERNAL CAROTID ARTERY: ICD-10-CM

## 2024-08-04 DIAGNOSIS — I67.89 CEREBRAL MICROVASCULAR DISEASE: ICD-10-CM

## 2024-08-04 PROCEDURE — A9579 GAD-BASE MR CONTRAST NOS,1ML: HCPCS | Performed by: PSYCHIATRY & NEUROLOGY

## 2024-08-04 PROCEDURE — 70553 MRI BRAIN STEM W/O & W/DYE: CPT

## 2024-08-04 PROCEDURE — 6360000004 HC RX CONTRAST MEDICATION: Performed by: PSYCHIATRY & NEUROLOGY

## 2024-08-04 RX ADMIN — GADOTERIDOL 15 ML: 279.3 INJECTION, SOLUTION INTRAVENOUS at 15:32

## 2024-08-05 ENCOUNTER — CLINICAL DOCUMENTATION (OUTPATIENT)
Age: 64
End: 2024-08-05

## 2024-08-05 NOTE — PROGRESS NOTES
I called the patient, no answer, left message her MRI scan of the brain was normal, and that is good news, and she can check the results on [employer] or call us if she has any questions

## 2024-09-06 DIAGNOSIS — R11.10 DRY HEAVES: ICD-10-CM

## 2024-09-06 RX ORDER — PANTOPRAZOLE SODIUM 40 MG/1
TABLET, DELAYED RELEASE ORAL
Qty: 90 TABLET | Refills: 1 | Status: SHIPPED | OUTPATIENT
Start: 2024-09-06

## 2024-09-06 NOTE — TELEPHONE ENCOUNTER
Last appointment: 5/9/24  Next appointment: 11/11/24  Previous refill encounter(s): 1/31/24 #90 with 1 refill    Requested Prescriptions     Pending Prescriptions Disp Refills    pantoprazole (PROTONIX) 40 MG tablet [Pharmacy Med Name: PANTOPRAZOLE SOD DR 40 MG TAB] 90 tablet 1     Sig: TAKE 1 TABLET BY MOUTH EVERY DAY         For Pharmacy Admin Tracking Only    Program: Medication Refill  CPA in place:    Recommendation Provided To:   Intervention Detail: New Rx: 1, reason: Patient Preference  Intervention Accepted By:   Gap Closed?:    Time Spent (min): 5

## 2025-02-04 ENCOUNTER — TELEPHONE (OUTPATIENT)
Age: 65
End: 2025-02-04

## 2025-02-04 NOTE — TELEPHONE ENCOUNTER
Attempted to contact patient regarding upcoming Medicare wellness appointment and completion of HRA questionnaire. LVM for patient to please return call at  [phone].

## 2025-02-05 SDOH — HEALTH STABILITY: PHYSICAL HEALTH
ON AVERAGE, HOW MANY DAYS PER WEEK DO YOU ENGAGE IN MODERATE TO STRENUOUS EXERCISE (LIKE A BRISK WALK)?: PATIENT DECLINED

## 2025-02-05 SDOH — ECONOMIC STABILITY: TRANSPORTATION INSECURITY
IN THE PAST 12 MONTHS, HAS LACK OF TRANSPORTATION KEPT YOU FROM MEETINGS, WORK, OR FROM GETTING THINGS NEEDED FOR DAILY LIVING?: NO

## 2025-02-05 SDOH — ECONOMIC STABILITY: FOOD INSECURITY: WITHIN THE PAST 12 MONTHS, YOU WORRIED THAT YOUR FOOD WOULD RUN OUT BEFORE YOU GOT MONEY TO BUY MORE.: SOMETIMES TRUE

## 2025-02-05 SDOH — ECONOMIC STABILITY: INCOME INSECURITY: IN THE LAST 12 MONTHS, WAS THERE A TIME WHEN YOU WERE NOT ABLE TO PAY THE MORTGAGE OR RENT ON TIME?: NO

## 2025-02-05 SDOH — ECONOMIC STABILITY: FOOD INSECURITY: WITHIN THE PAST 12 MONTHS, THE FOOD YOU BOUGHT JUST DIDN'T LAST AND YOU DIDN'T HAVE MONEY TO GET MORE.: SOMETIMES TRUE

## 2025-02-05 SDOH — ECONOMIC STABILITY: TRANSPORTATION INSECURITY
IN THE PAST 12 MONTHS, HAS THE LACK OF TRANSPORTATION KEPT YOU FROM MEDICAL APPOINTMENTS OR FROM GETTING MEDICATIONS?: NO

## 2025-02-05 ASSESSMENT — LIFESTYLE VARIABLES
HOW OFTEN DO YOU HAVE A DRINK CONTAINING ALCOHOL: MONTHLY OR LESS
HOW MANY STANDARD DRINKS CONTAINING ALCOHOL DO YOU HAVE ON A TYPICAL DAY: 1 OR 2
HOW MANY STANDARD DRINKS CONTAINING ALCOHOL DO YOU HAVE ON A TYPICAL DAY: 1
HOW OFTEN DO YOU HAVE A DRINK CONTAINING ALCOHOL: 2
HOW OFTEN DO YOU HAVE SIX OR MORE DRINKS ON ONE OCCASION: 1

## 2025-02-05 ASSESSMENT — PATIENT HEALTH QUESTIONNAIRE - PHQ9
2. FEELING DOWN, DEPRESSED OR HOPELESS: SEVERAL DAYS
SUM OF ALL RESPONSES TO PHQ QUESTIONS 1-9: 2
SUM OF ALL RESPONSES TO PHQ QUESTIONS 1-9: 2
SUM OF ALL RESPONSES TO PHQ9 QUESTIONS 1 & 2: 2
1. LITTLE INTEREST OR PLEASURE IN DOING THINGS: SEVERAL DAYS
SUM OF ALL RESPONSES TO PHQ QUESTIONS 1-9: 2
SUM OF ALL RESPONSES TO PHQ QUESTIONS 1-9: 2

## 2025-02-07 ENCOUNTER — OFFICE VISIT (OUTPATIENT)
Age: 65
End: 2025-02-07
Payer: MEDICARE

## 2025-02-07 VITALS
DIASTOLIC BLOOD PRESSURE: 77 MMHG | OXYGEN SATURATION: 99 % | HEART RATE: 73 BPM | HEIGHT: 62 IN | SYSTOLIC BLOOD PRESSURE: 113 MMHG | TEMPERATURE: 97.1 F | WEIGHT: 182.98 LBS | BODY MASS INDEX: 33.67 KG/M2 | RESPIRATION RATE: 16 BRPM

## 2025-02-07 DIAGNOSIS — K52.9 CHRONIC DIARRHEA: ICD-10-CM

## 2025-02-07 DIAGNOSIS — G40.209 COMPLEX PARTIAL SEIZURES EVOLVING TO GENERALIZED TONIC-CLONIC SEIZURES (HCC): ICD-10-CM

## 2025-02-07 DIAGNOSIS — Z00.00 MEDICARE ANNUAL WELLNESS VISIT, SUBSEQUENT: Primary | ICD-10-CM

## 2025-02-07 DIAGNOSIS — F31.9 BIPOLAR AFFECTIVE DISORDER, REMISSION STATUS UNSPECIFIED (HCC): ICD-10-CM

## 2025-02-07 DIAGNOSIS — F19.94 OTHER PSYCHOACTIVE SUBSTANCE USE, UNSPECIFIED WITH PSYCHOACTIVE SUBSTANCE-INDUCED MOOD DISORDER (HCC): ICD-10-CM

## 2025-02-07 DIAGNOSIS — Z12.11 COLON CANCER SCREENING: ICD-10-CM

## 2025-02-07 PROCEDURE — 99213 OFFICE O/P EST LOW 20 MIN: CPT | Performed by: STUDENT IN AN ORGANIZED HEALTH CARE EDUCATION/TRAINING PROGRAM

## 2025-02-07 RX ORDER — DICYCLOMINE HYDROCHLORIDE 10 MG/1
10 CAPSULE ORAL 4 TIMES DAILY PRN
Qty: 60 CAPSULE | Refills: 2 | Status: SHIPPED | OUTPATIENT
Start: 2025-02-07

## 2025-02-07 RX ORDER — LOPERAMIDE HYDROCHLORIDE 2 MG/1
TABLET ORAL
Qty: 60 TABLET | Refills: 1 | Status: SHIPPED | OUTPATIENT
Start: 2025-02-07

## 2025-02-07 ASSESSMENT — PATIENT HEALTH QUESTIONNAIRE - PHQ9
9. THOUGHTS THAT YOU WOULD BE BETTER OFF DEAD, OR OF HURTING YOURSELF: NOT AT ALL
5. POOR APPETITE OR OVEREATING: NOT AT ALL
SUM OF ALL RESPONSES TO PHQ QUESTIONS 1-9: 2
7. TROUBLE CONCENTRATING ON THINGS, SUCH AS READING THE NEWSPAPER OR WATCHING TELEVISION: NOT AT ALL
1. LITTLE INTEREST OR PLEASURE IN DOING THINGS: SEVERAL DAYS
SUM OF ALL RESPONSES TO PHQ QUESTIONS 1-9: 2
SUM OF ALL RESPONSES TO PHQ9 QUESTIONS 1 & 2: 2
10. IF YOU CHECKED OFF ANY PROBLEMS, HOW DIFFICULT HAVE THESE PROBLEMS MADE IT FOR YOU TO DO YOUR WORK, TAKE CARE OF THINGS AT HOME, OR GET ALONG WITH OTHER PEOPLE: NOT DIFFICULT AT ALL
3. TROUBLE FALLING OR STAYING ASLEEP: NOT AT ALL
2. FEELING DOWN, DEPRESSED OR HOPELESS: SEVERAL DAYS
6. FEELING BAD ABOUT YOURSELF - OR THAT YOU ARE A FAILURE OR HAVE LET YOURSELF OR YOUR FAMILY DOWN: NOT AT ALL
8. MOVING OR SPEAKING SO SLOWLY THAT OTHER PEOPLE COULD HAVE NOTICED. OR THE OPPOSITE, BEING SO FIGETY OR RESTLESS THAT YOU HAVE BEEN MOVING AROUND A LOT MORE THAN USUAL: NOT AT ALL
SUM OF ALL RESPONSES TO PHQ QUESTIONS 1-9: 2
4. FEELING TIRED OR HAVING LITTLE ENERGY: NOT AT ALL
SUM OF ALL RESPONSES TO PHQ QUESTIONS 1-9: 2

## 2025-02-07 NOTE — ASSESSMENT & PLAN NOTE
Suspect IBS-Diarrhea. Starting on meds and recommend seeing GI.   - Dietary and lifestyle modification - Manage abdominal bloating and distension. Advised to avoid foods that increase flatulence (eg, beans, onions, celery, carrots, raisins, bananas, apricots, prunes, [place] sprouts, wheat germ, pretzels, and bagels). We suggest a diet low in FODMAPs in patients with persistent symptoms despite exclusion of gas-producing foods.

## 2025-02-07 NOTE — PATIENT INSTRUCTIONS
- Dietary and lifestyle modification - Manage abdominal bloating and distension. Advised to avoid foods that increase flatulence (eg, beans, onions, celery, carrots, raisins, bananas, apricots, prunes, [place] sprouts, wheat germ, pretzels, and bagels). We suggest a diet low in FODMAPs in patients with persistent symptoms despite exclusion of gas-producing foods.  - Antispasmodic like (ex  Bentyl and hyoscyamine); Start Dicyclomine (Bentyl) 20 mg orally four times daily as needed     Antidiarrheal agents --   start Imodium (Loperamide) 2 mg 45 minutes before a meal on regularly scheduled doses. Antidiarrheal agents inhibit peristalsis, prolong transit time, and reduce fecal volume. However, loperamide should not be used in patients with IBS-C and should be used in limited doses, on an as-needed basis, in patients with alternating diarrhea and constipation (maximum daily dose 16 mg/day).         [facility] Utility - Financial Resources*  (Call [employer]/[facility] if need more resources.)  Utilities  [employer]  What they offer: Partnership with the [place] [employer] of . Assist with finding and applying for government funded programs and benefits. You can also update your benefits or report changes through [employer].  Website: https://www.[employer]/  Phone Number: 462-8MFABRP (656-857-3465)    [employer]  What they offer: [employer] is [employer] energy assistance program of last resort for anyone who faces financial hardships from unemployment or family crisis.  Phone Number: 260.787.3088  Address: [address] [place] [address]  Website: https://www.[employer]!/[place]/billing/billing-options/energyshare    Energy Assistance Programs (EAP) - Department of   What they offer: EAP assists low-income households in meeting their immediate home energy needs.      Website: https://www.[employer].[place].gov/benefit/ea/  Available

## 2025-02-07 NOTE — PROGRESS NOTES
Medicare Annual Wellness Visit    Tiffany Watson is here for Medicare AWV    Assessment & Plan   Medicare annual wellness visit, subsequent  Chronic diarrhea  Assessment & Plan:  Suspect IBS-Diarrhea. Starting on meds and recommend seeing GI.   - Dietary and lifestyle modification - Manage abdominal bloating and distension. Advised to avoid foods that increase flatulence (eg, beans, onions, celery, carrots, raisins, bananas, apricots, prunes, [place] sprouts, wheat germ, pretzels, and bagels). We suggest a diet low in FODMAPs in patients with persistent symptoms despite exclusion of gas-producing foods.      Orders:  -     loperamide (IMODIUM A-D) 2 MG tablet; TAKE 1 TABLET ABOUT 45 MINUTES BEFORE MEALS (maximum: 8 TABLETS/day), Disp-60 tablet, R-1Normal  -     dicyclomine (BENTYL) 10 MG capsule; Take 1 capsule by mouth 4 times daily as needed (stomach cramps), Disp-60 capsule, R-2Normal  Complex partial seizures evolving to generalized tonic-clonic seizures (HCC)  Assessment & Plan:   Monitored by specialist- no acute findings meriting change in the plan  Bipolar affective disorder, remission status unspecified (HCC)  Assessment & Plan:   Monitored by specialist- no acute findings meriting change in the plan  Colon cancer screening  -     [facility] - Genna Hair MD, Gastroenterology, [place] ([facility])  Other psychoactive substance use, 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     Subjective       Patient's complete Health Risk Assessment and screening values have been reviewed and are found in Flowsheets. The following problems were reviewed today and where indicated follow up appointments were made and/or referrals ordered.    Positive Risk Factor Screenings with Interventions:    Fall Risk:  Do you feel unsteady or are you worried about falling? : no  2 or more falls in past year?: no  Fall with injury in past year?: (!) yes     Interventions:

## 2025-02-07 NOTE — PROGRESS NOTES
[facility]  [address] [place] [address] [phone]    Chief Complaint: chronic medical problems    Subjective  Tiffany Watson is a 64 y.o.  / [place] Native female , established patient, here for evaluation of the concern(s) below;    Pt have a PMHx significant for depression/anxiety, Knee pain s/p Right TKA (2023) and left TKA (2024),  seizure and sleep disorder and s/p gastric bypass (~);    Chronic diarrhea:  States that this is ongoing for about a year. She was referred to GI twice but had to cancel appointment and was told they will no longer see her at the practice per her report.  No blood in the stool.  States that she would go about 4- 5 times some days and generally feel relived after going.   She reports bowel movement with the least thing she eats.  No nausea/vomiting.  States that it runs in her family and her 2 sons all have similar issues.     Depression/Insomnia/Restless leg:  Sees psych (Dr. Herrmann).  Pt on Zoloft, Lamictal Ambien and ropinirole.  Pt on Ambien, now takes 10mg each night since the passing of her  in 2021.   She has been through several classes of sleep medications including Trazodone, doxepin and klonipin in the past.      Seizure:  Had a generalized seizure in  and was started on Lamotrigine.   Now follows with neuro.     Pt denies any  fever, chill, chest pain, SOB, abdominal pain, n/v/d, HA or dizziness.      SHx:   .  Has 2 sons  Cousin to Natasha Daniel NP    Allergies - reviewed:   Allergies   Allergen Reactions    Adhesive Tape Rash    Benzalkonium Chloride Itching    Lubricants Rash    Sulfa Antibiotics Rash and Itching    Skin Protectants, Misc. 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

## 2025-02-07 NOTE — PROGRESS NOTES
Chief Complaint   Patient presents with    Medicare AWV     \"Have you been to the ER, urgent care clinic since your last visit?  Hospitalized since your last visit?\"    NO    “Have you seen or consulted any other health care providers outside of [facility] since your last visit?”    NO    “Have you had a colorectal cancer screening such as a colonoscopy/FIT/Cologuard?    NO     Have you had a mammogram?”   NO    Date of last Mammogram: 2022           Vitals:    25 1432   BP: 113/77   Pulse: 73   Resp: 16   Temp: 97.1 °F (36.2 °C)   TempSrc: Temporal   SpO2: 99%   Weight: 83 kg (182 lb 15.7 oz)   Height: 1.575 m (5' 2\")      Health Maintenance Due   Topic Date Due    Hepatitis C screen  Never done    Colorectal Cancer Screen  Never done    Shingles vaccine (1 of 2) Never done    Pneumococcal 50+ years Vaccine (1 of 1 - PCV) Never done    DEXA (modify frequency per FRAX score)  Never done    Respiratory Syncytial Virus (RSV) Pregnant or age 60 yrs+ (1 - Risk 60-74 years 1-dose series) Never done    COVID-19 Vaccine (2024- season) 2024    Breast cancer screen  2024    Annual Wellness Visit (Medicare Advantage)  2025      The patient, Tiffany Watson, identity was verified by name and , pharmacy verified  Labs:Yes  Fasting:No

## 2025-02-17 DIAGNOSIS — K52.9 CHRONIC DIARRHEA: ICD-10-CM

## 2025-02-17 RX ORDER — DICYCLOMINE HYDROCHLORIDE 10 MG/1
10 CAPSULE ORAL 4 TIMES DAILY PRN
Qty: 360 CAPSULE | Refills: 1 | Status: SHIPPED | OUTPATIENT
Start: 2025-02-17

## 2025-02-17 NOTE — TELEPHONE ENCOUNTER
Pharmacy comment: REQUEST FOR 90 DAYS PRESCRIPTION.     Last appointment: 2/7/25  Next appointment: 8/6/25  Previous refill encounter(s): 2/7/25    Requested Prescriptions     Pending Prescriptions Disp Refills    dicyclomine (BENTYL) 10 MG capsule [Pharmacy Med Name: DICYCLOMINE 10 MG CAPSULE] 360 capsule 1     Sig: TAKE 1 CAPSULE BY MOUTH 4 TIMES DAILY AS NEEDED (STOMACH CRAMPS)         For Pharmacy Admin Tracking Only    Program: Medication Refill  CPA in place:    Recommendation Provided To:   Intervention Detail: New Rx: 1, reason: Improve Adherence  Intervention Accepted By:   Gap Closed?:    Time Spent (min): 5

## 2025-03-05 ENCOUNTER — OFFICE VISIT (OUTPATIENT)
Age: 65
End: 2025-03-05
Payer: MEDICARE

## 2025-03-05 VITALS
BODY MASS INDEX: 33.06 KG/M2 | HEART RATE: 70 BPM | WEIGHT: 186.6 LBS | DIASTOLIC BLOOD PRESSURE: 88 MMHG | RESPIRATION RATE: 18 BRPM | SYSTOLIC BLOOD PRESSURE: 136 MMHG | TEMPERATURE: 97.6 F | HEIGHT: 63 IN | OXYGEN SATURATION: 99 %

## 2025-03-05 DIAGNOSIS — I65.23 BILATERAL CAROTID ARTERY STENOSIS: ICD-10-CM

## 2025-03-05 DIAGNOSIS — G40.209 PARTIAL SYMPTOMATIC EPILEPSY WITH COMPLEX PARTIAL SEIZURES, NOT INTRACTABLE, WITHOUT STATUS EPILEPTICUS (HCC): ICD-10-CM

## 2025-03-05 DIAGNOSIS — I67.89 CEREBRAL MICROVASCULAR DISEASE: ICD-10-CM

## 2025-03-05 DIAGNOSIS — R41.3 DISTURBANCE OF MEMORY: ICD-10-CM

## 2025-03-05 DIAGNOSIS — G40.209 COMPLEX PARTIAL SEIZURES EVOLVING TO GENERALIZED TONIC-CLONIC SEIZURES (HCC): Primary | ICD-10-CM

## 2025-03-05 DIAGNOSIS — G25.81 RLS (RESTLESS LEGS SYNDROME): ICD-10-CM

## 2025-03-05 DIAGNOSIS — Z51.81 THERAPEUTIC DRUG MONITORING: ICD-10-CM

## 2025-03-05 PROCEDURE — G8427 DOCREV CUR MEDS BY ELIG CLIN: HCPCS | Performed by: PSYCHIATRY & NEUROLOGY

## 2025-03-05 PROCEDURE — 1090F PRES/ABSN URINE INCON ASSESS: CPT | Performed by: PSYCHIATRY & NEUROLOGY

## 2025-03-05 PROCEDURE — 99214 OFFICE O/P EST MOD 30 MIN: CPT | Performed by: PSYCHIATRY & NEUROLOGY

## 2025-03-05 PROCEDURE — 1126F AMNT PAIN NOTED NONE PRSNT: CPT | Performed by: PSYCHIATRY & NEUROLOGY

## 2025-03-05 PROCEDURE — 1123F ACP DISCUSS/DSCN MKR DOCD: CPT | Performed by: PSYCHIATRY & NEUROLOGY

## 2025-03-05 PROCEDURE — 1159F MED LIST DOCD IN RCRD: CPT | Performed by: PSYCHIATRY & NEUROLOGY

## 2025-03-05 PROCEDURE — 1160F RVW MEDS BY RX/DR IN RCRD: CPT | Performed by: PSYCHIATRY & NEUROLOGY

## 2025-03-05 PROCEDURE — 1036F TOBACCO NON-USER: CPT | Performed by: PSYCHIATRY & NEUROLOGY

## 2025-03-05 PROCEDURE — 3017F COLORECTAL CA SCREEN DOC REV: CPT | Performed by: PSYCHIATRY & NEUROLOGY

## 2025-03-05 PROCEDURE — G8400 PT W/DXA NO RESULTS DOC: HCPCS | Performed by: PSYCHIATRY & NEUROLOGY

## 2025-03-05 PROCEDURE — G8417 CALC BMI ABV UP PARAM F/U: HCPCS | Performed by: PSYCHIATRY & NEUROLOGY

## 2025-03-05 RX ORDER — LAMOTRIGINE 100 MG/1
100 TABLET ORAL 2 TIMES DAILY
Qty: 60 TABLET | Refills: 11
Start: 2025-03-05

## 2025-03-05 ASSESSMENT — PATIENT HEALTH QUESTIONNAIRE - PHQ9
2. FEELING DOWN, DEPRESSED OR HOPELESS: NOT AT ALL
SUM OF ALL RESPONSES TO PHQ QUESTIONS 1-9: 0
1. LITTLE INTEREST OR PLEASURE IN DOING THINGS: NOT AT ALL

## 2025-03-05 NOTE — PROGRESS NOTES
Lamictal level, when it was checked back in 2016 her level was 8.9 in the therapeutic range.  I am not sure what dose she was on then.  She had a normal EEG in 2017 and a normal EEG in 2014.  They were reviewed on her previous records on the old chart.  She had a CT scan of the head done 2022 but the CTA of the neck that was normal then.  We reviewed all her neurology notes and the multiple neurologist who saw her in 2011 on.  She is to continue her Requip 3 mg each day at bedtime for her restless leg syndrome.  For her memory loss she will get a B12 and folate acid and vitamin D levels, and get the MRI of the brain, and she will get carotid Dopplers, and she may need neuropsych testing depending on results of these tests and screening parameters.  For the seizures she will get her level of lamotrigine checked continue her current dose of medication and get an ambulatory 24-hour EEG.  She will call us immediately if she has a problem.    She is encouraged to stay physically and mentally active, try to exercise half an hour every day, and eat a Mediterranean type diet overall good health.  She is also encouraged to make sure she gets to normal monthly to help prevent any seizures and help prevent any further memory loss.  Because of her microvascular disease on her previous CT scan she will get a carotid Doppler done next week to rule out carotid stenosis.  She will be seen in 3 months time for follow-up and medications adjusted during her results of her test, cognitive enhancing agents or neuropsych testing for treatable cause of her memory loss as well.    34-minute spent with the patient going over all this in detail she is a great risk for further possible injury to have seizures and falling and may injure herself..    Signed By: Andrea Pena MD     March 5, 2025       CC: Pedro Cifuentes MD  FAX: [phone]

## 2025-03-13 DIAGNOSIS — R11.10 DRY HEAVES: ICD-10-CM

## 2025-03-13 NOTE — TELEPHONE ENCOUNTER
Last appointment: 2/7/25  Next appointment: 8/6/25  Previous refill encounter(s): 9/6/24 #90 with 1 refill    Requested Prescriptions     Pending Prescriptions Disp Refills    pantoprazole (PROTONIX) 40 MG tablet [Pharmacy Med Name: PANTOPRAZOLE SOD DR 40 MG TAB] 30 tablet 0     Sig: TAKE 1 TABLET BY MOUTH EVERY DAY         For Pharmacy Admin Tracking Only    Program: Medication Refill  CPA in place:    Recommendation Provided To:   Intervention Detail: New Rx: 1, reason: Patient Preference  Intervention Accepted By:   Gap Closed?:    Time Spent (min): 5

## 2025-03-14 RX ORDER — PANTOPRAZOLE SODIUM 40 MG/1
40 TABLET, DELAYED RELEASE ORAL DAILY
Qty: 30 TABLET | Refills: 0 | Status: SHIPPED | OUTPATIENT
Start: 2025-03-14

## 2025-04-02 ENCOUNTER — HOSPITAL ENCOUNTER (OUTPATIENT)
Facility: HOSPITAL | Age: 65
Discharge: HOME | End: 2025-04-05
Attending: PSYCHIATRY & NEUROLOGY
Payer: MEDICARE

## 2025-04-02 DIAGNOSIS — G40.209 PARTIAL SYMPTOMATIC EPILEPSY WITH COMPLEX PARTIAL SEIZURES, NOT INTRACTABLE, WITHOUT STATUS EPILEPTICUS: ICD-10-CM

## 2025-04-02 DIAGNOSIS — G40.209 COMPLEX PARTIAL SEIZURES EVOLVING TO GENERALIZED TONIC-CLONIC SEIZURES (HCC): ICD-10-CM

## 2025-04-02 DIAGNOSIS — R41.3 DISTURBANCE OF MEMORY: ICD-10-CM

## 2025-04-02 PROCEDURE — 95700 EEG CONT REC W/VID EEG TECH: CPT

## 2025-04-02 PROCEDURE — 95719 EEG PHYS/QHP EA INCR W/O VID: CPT | Performed by: PSYCHIATRY & NEUROLOGY

## 2025-04-02 PROCEDURE — 95708 EEG WO VID EA 12-26HR UNMNTR: CPT

## 2025-04-04 NOTE — PROCEDURES
[place] [id number]                                   EEG      PATIENT NAME: JOVAN ANN               : 1960  MED REC NO: [id number]                       ROOM:   ACCOUNT NO: [id number]                       ADMIT DATE: 2025  PROVIDER: Andrea Pena MD    DATE OF SERVICE:  2025    REFERRING PHYSICIAN:  Andrea Pena MD    DATE OF STUDY:  2025 to 2025.    CLINICAL INDICATION:  The patient is a 64-year-old female with a history of spells of altered mental status, staring spells into space, possible seizures, possible complex partial seizures.  EEG to rule out seizures, rule out cortical abnormality.    EEG CLASSIFICATION:  Essentially normal ambulatory 24-hour EEG recording.    DESCRIPTION OF THE RECORD:  This is a 16-channel EEG recording on patient for prolonged recording to evaluate for possible breakthrough seizures, the patient having intermittent staring spells.  This study began on 2025 at approximately 12:06 p.m. and ended on 2025 at approximately 9:04 a.m. for total time of study of approximately 21 hours.  During this time, the patient had a posteriorly located occipital alpha rhythm of 8-9 Hz seen in the posterior head regions.  There was considerable movement electrode and muscle artifacts seen throughout the recording.  There were no clear areas of focal slowing, no clear spike or spike-and-wave discharges, and no recorded electrographic spells of any type seen.  The patient did enter prolonged states of sleep in the evening hours with K complexes and sleep spindles seen in central head regions.  Hyperventilation was not performed.  Photic stimulation was not performed.  On her clinical diary, the patient recorded headaches in daily living activities, but no spells were described that sounded like seizures or abnormal events.    INTERPRETATION:  This is essentially normal

## 2025-04-07 DIAGNOSIS — R11.10 DRY HEAVES: ICD-10-CM

## 2025-04-07 RX ORDER — PANTOPRAZOLE SODIUM 40 MG/1
40 TABLET, DELAYED RELEASE ORAL DAILY
Qty: 30 TABLET | Refills: 0 | Status: SHIPPED | OUTPATIENT
Start: 2025-04-07

## 2025-05-02 ENCOUNTER — HOSPITAL ENCOUNTER (EMERGENCY)
Facility: HOSPITAL | Age: 65
Discharge: HOME | End: 2025-05-03
Attending: STUDENT IN AN ORGANIZED HEALTH CARE EDUCATION/TRAINING PROGRAM
Payer: MEDICARE

## 2025-05-02 DIAGNOSIS — G40.919 BREAKTHROUGH SEIZURE (HCC): Primary | ICD-10-CM

## 2025-05-02 DIAGNOSIS — S09.90XA INJURY OF HEAD, INITIAL ENCOUNTER: ICD-10-CM

## 2025-05-02 PROCEDURE — 99284 EMERGENCY DEPT VISIT MOD MDM: CPT

## 2025-05-03 ENCOUNTER — APPOINTMENT (OUTPATIENT)
Facility: HOSPITAL | Age: 65
End: 2025-05-03
Payer: MEDICARE

## 2025-05-03 VITALS
HEART RATE: 107 BPM | BODY MASS INDEX: 32.78 KG/M2 | HEIGHT: 63 IN | SYSTOLIC BLOOD PRESSURE: 160 MMHG | RESPIRATION RATE: 25 BRPM | TEMPERATURE: 98 F | WEIGHT: 185 LBS | DIASTOLIC BLOOD PRESSURE: 77 MMHG | OXYGEN SATURATION: 97 %

## 2025-05-03 LAB
ANION GAP SERPL CALC-SCNC: 9 MMOL/L (ref 2–12)
BASOPHILS # BLD: 0.08 K/UL (ref 0–0.1)
BASOPHILS NFR BLD: 0.8 % (ref 0–1)
BUN SERPL-MCNC: 12 MG/DL (ref 6–20)
BUN/CREAT SERPL: 14 (ref 12–20)
CALCIUM SERPL-MCNC: 9 MG/DL (ref 8.5–10.1)
CHLORIDE SERPL-SCNC: 106 MMOL/L (ref 97–108)
CO2 SERPL-SCNC: 22 MMOL/L (ref 21–32)
COMMENT:: NORMAL
CREAT SERPL-MCNC: 0.84 MG/DL (ref 0.55–1.02)
DIFFERENTIAL METHOD BLD: ABNORMAL
EOSINOPHIL # BLD: 0.3 K/UL (ref 0–0.4)
EOSINOPHIL NFR BLD: 3.1 % (ref 0–7)
ERYTHROCYTE [DISTWIDTH] IN BLOOD BY AUTOMATED COUNT: 12.6 % (ref 11.5–14.5)
GLUCOSE SERPL-MCNC: 101 MG/DL (ref 65–100)
HCT VFR BLD AUTO: 40.1 % (ref 35–47)
HGB BLD-MCNC: 12.8 G/DL (ref 11.5–16)
IMM GRANULOCYTES # BLD AUTO: 0.03 K/UL (ref 0–0.04)
IMM GRANULOCYTES NFR BLD AUTO: 0.3 % (ref 0–0.5)
LYMPHOCYTES # BLD: 3.12 K/UL (ref 0.8–3.5)
LYMPHOCYTES NFR BLD: 32.3 % (ref 12–49)
MCH RBC QN AUTO: 30.5 PG (ref 26–34)
MCHC RBC AUTO-ENTMCNC: 31.9 G/DL (ref 30–36.5)
MCV RBC AUTO: 95.7 FL (ref 80–99)
MONOCYTES # BLD: 0.85 K/UL (ref 0–1)
MONOCYTES NFR BLD: 8.8 % (ref 5–13)
NEUTS SEG # BLD: 5.29 K/UL (ref 1.8–8)
NEUTS SEG NFR BLD: 54.7 % (ref 32–75)
NRBC # BLD: 0 K/UL (ref 0–0.01)
NRBC BLD-RTO: 0 PER 100 WBC
PLATELET # BLD AUTO: 402 K/UL (ref 150–400)
PMV BLD AUTO: 10.2 FL (ref 8.9–12.9)
POTASSIUM SERPL-SCNC: 3.6 MMOL/L (ref 3.5–5.1)
RBC # BLD AUTO: 4.19 M/UL (ref 3.8–5.2)
SODIUM SERPL-SCNC: 137 MMOL/L (ref 136–145)
SPECIMEN HOLD: NORMAL
WBC # BLD AUTO: 9.7 K/UL (ref 3.6–11)

## 2025-05-03 PROCEDURE — 80048 BASIC METABOLIC PNL TOTAL CA: CPT

## 2025-05-03 PROCEDURE — 85025 COMPLETE CBC W/AUTO DIFF WBC: CPT

## 2025-05-03 PROCEDURE — 2580000003 HC RX 258: Performed by: STUDENT IN AN ORGANIZED HEALTH CARE EDUCATION/TRAINING PROGRAM

## 2025-05-03 PROCEDURE — 70450 CT HEAD/BRAIN W/O DYE: CPT

## 2025-05-03 PROCEDURE — 80175 DRUG SCREEN QUAN LAMOTRIGINE: CPT

## 2025-05-03 PROCEDURE — 72125 CT NECK SPINE W/O DYE: CPT

## 2025-05-03 RX ORDER — 0.9 % SODIUM CHLORIDE 0.9 %
1000 INTRAVENOUS SOLUTION INTRAVENOUS ONCE
Status: COMPLETED | OUTPATIENT
Start: 2025-05-03 | End: 2025-05-03

## 2025-05-03 RX ADMIN — SODIUM CHLORIDE 1000 ML: 9 INJECTION, SOLUTION INTRAVENOUS at 00:42

## 2025-05-03 ASSESSMENT — PAIN DESCRIPTION - DESCRIPTORS: DESCRIPTORS: ACHING

## 2025-05-03 ASSESSMENT — LIFESTYLE VARIABLES
HOW OFTEN DO YOU HAVE A DRINK CONTAINING ALCOHOL: MONTHLY OR LESS
HOW MANY STANDARD DRINKS CONTAINING ALCOHOL DO YOU HAVE ON A TYPICAL DAY: 3 OR 4

## 2025-05-03 ASSESSMENT — PAIN DESCRIPTION - ORIENTATION: ORIENTATION: RIGHT

## 2025-05-03 ASSESSMENT — PAIN DESCRIPTION - LOCATION: LOCATION: HEAD

## 2025-05-03 ASSESSMENT — PAIN - FUNCTIONAL ASSESSMENT: PAIN_FUNCTIONAL_ASSESSMENT: 0-10

## 2025-05-03 NOTE — ED PROVIDER NOTES
[facility] EMERGENCY DEPARTMENT  EMERGENCY DEPARTMENT ENCOUNTER      Pt Name: Tiffany Watson  MRN: [id number]  Birthdate 1960  Date of evaluation: 5/2/2025  Provider: Smitha Kim DO    CHIEF COMPLAINT       Chief Complaint   Patient presents with    Seizures         HISTORY OF PRESENT ILLNESS    HPI    Tiffany Watson is a 64 y.o. female with a history of GERD, depression, partial seizures on Lamictal who presents to the emergency department for evaluation of a seizure.  Patient reports drinking alcohol tonight, states she does not recall what happened but fell face forward and reportedly had a seizure.  She endorses pain to her head but no other complaints.  Tetanus up-to-date in 2018.    Nursing Notes were reviewed.    REVIEW OF SYSTEMS       Review of Systems   Constitutional:  Negative for fever.   Neurological:  Positive for seizures and headaches.           PAST MEDICAL HISTORY     Past Medical History:   Diagnosis Date    Depression     GERD (gastroesophageal reflux disease)     Localization-related (focal) (partial) epilepsy and epileptic syndromes with complex partial seizures, without mention of intractable epilepsy 2011    Osteoarthritis 2018    PONV (postoperative nausea and vomiting)     Restless leg syndrome     Syncope 12/2023    patient reported event to PCP         SURGICAL HISTORY       Past Surgical History:   Procedure Laterality Date    BREAST BIOPSY Left 1974    Surgical - 2nd of this yr    BREAST BIOPSY Left 1974    Surgical    BREAST BIOPSY Bilateral 1992    Surgical    BREAST BIOPSY Bilateral 1992    Surgical - 2nd of this yr    BREAST BIOPSY Right     Surgical    BREAST SURGERY      fibroid removal    DILATION AND CURETTAGE OF UTERUS      GASTRIC BYPASS SURGERY      ORTHOPEDIC SURGERY      radha. heel spurs    TONSILLECTOMY      TOTAL KNEE ARTHROPLASTY Right 11/15/2023    RIGHT TOTAL KNEE ARTHROPLASTY WITH MAKOPLASTY performed by Moe Keith MD at [facility] MAIN OR    TOTAL

## 2025-05-03 NOTE — ED NOTES
Pt facial wounds cleaned with wound  and cotton wipes. Pt tolerated well. Scant bleeding noted from abrasion around right eye. Pt has bruising and swelling to forehead, eye and cheek

## 2025-05-03 NOTE — ED NOTES
Pt ambulated to restroom with stand by RN assist. Gait primarily steady. Speech remains slurred. IVF infusing. Pt awaiting results and dispo.

## 2025-05-03 NOTE — ED NOTES
Per witnesses, pt had a seizure in a bar parking lot. Pt has hx of seizures and states she last had a seizure in 2011 but they are changing her meds. Visitor at bedside states they cannot figure out why she is having seizures. Pt admits to drinking 4 vodka cranberrys tonight. Pt appears very intoxicated and is crying loudly and yelling out. IVF infusing. Pt awaiting results and dispo. Niece at bedside.

## 2025-05-03 NOTE — ED TRIAGE NOTES
Per witnesses at bar, pt had a seizure in the parking lot. Pt arrives alert and oriented. +ETOH. Pt has abrasions to right side of face

## 2025-05-05 LAB — LAMOTRIGINE SERPL-MCNC: 4.8 UG/ML (ref 2–20)

## 2025-05-06 DIAGNOSIS — R11.10 DRY HEAVES: ICD-10-CM

## 2025-05-06 RX ORDER — PANTOPRAZOLE SODIUM 40 MG/1
40 TABLET, DELAYED RELEASE ORAL DAILY
Qty: 90 TABLET | Refills: 0 | Status: SHIPPED | OUTPATIENT
Start: 2025-05-06

## 2025-05-14 ENCOUNTER — TELEPHONE (OUTPATIENT)
Age: 65
End: 2025-05-14

## 2025-05-14 ENCOUNTER — CLINICAL DOCUMENTATION (OUTPATIENT)
Age: 65
End: 2025-05-14

## 2025-05-14 NOTE — TELEPHONE ENCOUNTER
Patient states that last Friday she passed out and was sent to the ER. Pt has labs done and would like to have a call back from Dr. Pena on the reason she continues to have seizures. Please call her at [phone]

## 2025-05-15 NOTE — PROGRESS NOTES
I called the patient, she was at a bar, and had 4 drinks, stood up and was walking out and then just passed out, she had no seizure activity, but apparently was snoring and took a little while to come back to normal she went to the ER and they did not find anything, her Lamictal level was fine at 4.6 and I advised her that she should not drive because the law says he should not drive after any unexplained loss of consciousness.  When you faint or have a seizure.  I told her she probably should not drink having had the seizures, and we will follow her and she is supposed to call us if she has any other problems.

## 2025-08-01 DIAGNOSIS — R11.10 DRY HEAVES: ICD-10-CM

## 2025-08-02 RX ORDER — PANTOPRAZOLE SODIUM 40 MG/1
40 TABLET, DELAYED RELEASE ORAL DAILY
Qty: 90 TABLET | Refills: 0 | Status: SHIPPED | OUTPATIENT
Start: 2025-08-02

## 2025-08-05 ENCOUNTER — TELEPHONE (OUTPATIENT)
Dept: PHARMACY | Facility: CLINIC | Age: 65
End: 2025-08-05

## (undated) DEVICE — BLADE SURG SAW S STL NAR OSC W/ SERR EDGE DISP

## (undated) DEVICE — SOLUTION IV 1000ML 0.9% SOD CHL PH 5 INJ USP VIAFLX PLAS

## (undated) DEVICE — KIT TRK KNEE PROC VIZADISC

## (undated) DEVICE — ZIMMER® STERILE DISPOSABLE TOURNIQUET CUFF WITH PROTECTIVE SLEEVE AND PLC, DUAL PORT, SINGLE BLADDER, 34 IN. (86 CM)

## (undated) DEVICE — SOLUTION IRRIG 1000ML STRL H2O USP PLAS POUR BTL

## (undated) DEVICE — NEEDLE SPNL L3.5IN PNK HUB S STL REG WALL FIT STYL W/ QNCKE

## (undated) DEVICE — SPONGE GZ W4XL4IN COT 12 PLY TYP VII WVN C FLD DSGN STERILE

## (undated) DEVICE — SUTURE VICRYL SZ 1 L36IN ABSRB UD L36MM CT-1 1/2 CIR J947H

## (undated) DEVICE — CLOSURE SKIN FLX NONINVASIVE PRELOC TECHNOLOGY FOR 24IN

## (undated) DEVICE — SOLUTION ANTISEP 4OZ 70% ALC ISO 1ST AID

## (undated) DEVICE — GLOVE SURG SZ 85 L12IN FNGR THK79MIL GRN LTX FREE

## (undated) DEVICE — GOWN,SIRUS,NONRNF,SETINSLV,2XL,18/CS: Brand: MEDLINE

## (undated) DEVICE — TRANSFER SET 3": Brand: MEDLINE INDUSTRIES, INC.

## (undated) DEVICE — LIQUIBAND RAPID ADHESIVE 36/CS 0.8ML: Brand: MEDLINE

## (undated) DEVICE — SUTURE ABSRB L30CM 2-0 VLT SPRL PDS + STRATAFIX SXPP1B410

## (undated) DEVICE — HOOD: Brand: FLYTE

## (undated) DEVICE — DRAPE,U/ SHT,SPLIT,PLAS,STERIL: Brand: MEDLINE

## (undated) DEVICE — BOOT POS LEG DEMAYO

## (undated) DEVICE — APPLICATOR MEDICATED 26 CC SOLUTION HI LT ORNG CHLORAPREP

## (undated) DEVICE — PIN BONE FIX L110MM DIA3.2MM

## (undated) DEVICE — BANDAGE COMPR M W6INXL10YD WHT BGE VELC E MTRX HK AND LOOP

## (undated) DEVICE — 3M™ IOBAN™ 2 ANTIMICROBIAL INCISE DRAPE 6648EZ: Brand: IOBAN™ 2

## (undated) DEVICE — KIT DRP FOR RIO ROBOTIC ARM ASST SYS

## (undated) DEVICE — PREP KIT PEEL PTCH POVIDONE IOD

## (undated) DEVICE — SUTURE VCRL SZ 1 L36IN ABSRB UD L36MM CT-1 1/2 CIR J947H

## (undated) DEVICE — SUTURE MCRYL SZ 2-0 L36IN ABSRB UD L36MM CT-1 1/2 CIR Y945H

## (undated) DEVICE — DUAL CUT SAGITTAL BLADE

## (undated) DEVICE — DRAPE,ORTHOMAX,EXTREMITY: Brand: MEDLINE

## (undated) DEVICE — SUTURE STRATAFIX SYMMETRIC SZ 1 L18IN ABSRB VLT CT1 L36CM SXPP1A404

## (undated) DEVICE — GLOVE ORTHO 8   MSG9480

## (undated) DEVICE — SUTURE STRATAFIX SZ 3-0 30CM NONABSORB UD 26MM FS 3/8 SXMP2B412

## (undated) DEVICE — DRESSING FOAM POST OPERATIVE 4X10 IN MEPILEX BORDER AG

## (undated) DEVICE — TOTAL JOINT-MRMC: Brand: MEDLINE INDUSTRIES, INC.

## (undated) DEVICE — PIN BNE FIX L140MM DIA3.2MM SELF DRL

## (undated) DEVICE — YANKAUER,SMOOTH HANDLE,HIGH CAPACITY: Brand: MEDLINE INDUSTRIES, INC.

## (undated) DEVICE — SUTURE MONOCRYL SZ 2-0 L36IN ABSRB UD L36MM CT-1 1/2 CIR Y945H

## (undated) DEVICE — PIN BNE FIX L110MM DIA32MM

## (undated) DEVICE — HANDPIECE SET WITH COAXIAL HIGH FLOW TIP AND SUCTION TUBE: Brand: INTERPULSE

## (undated) DEVICE — INSERT TIB CS 3 10 MM ARTC POST KNEE BEAR TECHNOLOGY X3
Type: IMPLANTABLE DEVICE | Site: KNEE | Status: NON-FUNCTIONAL
Removed: 2023-11-15

## (undated) DEVICE — DRESSING SURG 4X14 IN POSTOP SIL BORD MEPILEX